# Patient Record
Sex: FEMALE | Race: WHITE | NOT HISPANIC OR LATINO
[De-identification: names, ages, dates, MRNs, and addresses within clinical notes are randomized per-mention and may not be internally consistent; named-entity substitution may affect disease eponyms.]

---

## 2017-02-17 ENCOUNTER — APPOINTMENT (OUTPATIENT)
Dept: CARDIOLOGY | Facility: CLINIC | Age: 72
End: 2017-02-17

## 2017-02-24 ENCOUNTER — APPOINTMENT (OUTPATIENT)
Dept: CARDIOLOGY | Facility: CLINIC | Age: 72
End: 2017-02-24

## 2017-02-28 ENCOUNTER — APPOINTMENT (OUTPATIENT)
Dept: CARDIOLOGY | Facility: CLINIC | Age: 72
End: 2017-02-28

## 2017-09-07 ENCOUNTER — APPOINTMENT (OUTPATIENT)
Dept: CARDIOLOGY | Facility: CLINIC | Age: 72
End: 2017-09-07
Payer: MEDICARE

## 2017-09-07 PROCEDURE — 99214 OFFICE O/P EST MOD 30 MIN: CPT

## 2017-09-28 ENCOUNTER — APPOINTMENT (OUTPATIENT)
Dept: CARDIOLOGY | Facility: CLINIC | Age: 72
End: 2017-09-28
Payer: MEDICARE

## 2017-09-28 PROCEDURE — 78452 HT MUSCLE IMAGE SPECT MULT: CPT

## 2017-09-28 PROCEDURE — A9502: CPT

## 2017-09-28 PROCEDURE — 93015 CV STRESS TEST SUPVJ I&R: CPT

## 2017-10-20 ENCOUNTER — APPOINTMENT (OUTPATIENT)
Dept: CARDIOLOGY | Facility: CLINIC | Age: 72
End: 2017-10-20
Payer: MEDICARE

## 2017-10-20 PROCEDURE — 99214 OFFICE O/P EST MOD 30 MIN: CPT

## 2017-11-15 ENCOUNTER — RX RENEWAL (OUTPATIENT)
Age: 72
End: 2017-11-15

## 2017-12-15 ENCOUNTER — OUTPATIENT (OUTPATIENT)
Dept: OUTPATIENT SERVICES | Facility: HOSPITAL | Age: 72
LOS: 1 days | End: 2017-12-15

## 2017-12-19 DIAGNOSIS — Z87.19 PERSONAL HISTORY OF OTHER DISEASES OF THE DIGESTIVE SYSTEM: ICD-10-CM

## 2017-12-19 DIAGNOSIS — Z86.39 PERSONAL HISTORY OF OTHER ENDOCRINE, NUTRITIONAL AND METABOLIC DISEASE: ICD-10-CM

## 2017-12-19 DIAGNOSIS — Z78.9 OTHER SPECIFIED HEALTH STATUS: ICD-10-CM

## 2017-12-19 DIAGNOSIS — Z86.73 PERSONAL HISTORY OF TRANSIENT ISCHEMIC ATTACK (TIA), AND CEREBRAL INFARCTION W/OUT RESIDUAL DEFICITS: ICD-10-CM

## 2017-12-19 DIAGNOSIS — Z86.69 PERSONAL HISTORY OF OTHER DISEASES OF THE NERVOUS SYSTEM AND SENSE ORGANS: ICD-10-CM

## 2017-12-19 DIAGNOSIS — Z86.79 PERSONAL HISTORY OF OTHER DISEASES OF THE CIRCULATORY SYSTEM: ICD-10-CM

## 2017-12-19 DIAGNOSIS — Z87.11 PERSONAL HISTORY OF PEPTIC ULCER DISEASE: ICD-10-CM

## 2017-12-19 DIAGNOSIS — Z82.3 FAMILY HISTORY OF STROKE: ICD-10-CM

## 2017-12-19 DIAGNOSIS — Z80.6 FAMILY HISTORY OF LEUKEMIA: ICD-10-CM

## 2017-12-19 DIAGNOSIS — Z87.39 PERSONAL HISTORY OF OTHER DISEASES OF THE MUSCULOSKELETAL SYSTEM AND CONNECTIVE TISSUE: ICD-10-CM

## 2017-12-19 RX ORDER — ASPIRIN ENTERIC COATED TABLETS 81 MG 81 MG/1
81 TABLET, DELAYED RELEASE ORAL DAILY
Qty: 30 | Refills: 0 | Status: ACTIVE | COMMUNITY

## 2017-12-21 ENCOUNTER — APPOINTMENT (OUTPATIENT)
Dept: CARDIOLOGY | Facility: CLINIC | Age: 72
End: 2017-12-21
Payer: MEDICARE

## 2017-12-21 ENCOUNTER — NON-APPOINTMENT (OUTPATIENT)
Age: 72
End: 2017-12-21

## 2017-12-21 VITALS
BODY MASS INDEX: 44.56 KG/M2 | DIASTOLIC BLOOD PRESSURE: 70 MMHG | HEIGHT: 61 IN | HEART RATE: 62 BPM | WEIGHT: 236 LBS | SYSTOLIC BLOOD PRESSURE: 116 MMHG

## 2017-12-21 PROCEDURE — 93000 ELECTROCARDIOGRAM COMPLETE: CPT

## 2017-12-21 PROCEDURE — 99214 OFFICE O/P EST MOD 30 MIN: CPT

## 2017-12-21 RX ORDER — MV-MIN/FOLIC/VIT K/LYCOP/COQ10 200-100MCG
CAPSULE ORAL
Refills: 0 | Status: ACTIVE | COMMUNITY

## 2017-12-21 RX ORDER — ATORVASTATIN CALCIUM 40 MG/1
40 TABLET, FILM COATED ORAL DAILY
Refills: 0 | Status: DISCONTINUED | COMMUNITY
End: 2017-12-21

## 2017-12-21 RX ORDER — TRIAMCINOLONE ACETONIDE 55 UL/1
55 SPRAY, METERED NASAL DAILY
Refills: 0 | Status: DISCONTINUED | COMMUNITY
End: 2017-12-21

## 2017-12-21 RX ORDER — FLUTICASONE PROPIONATE 50 MCG
50 SPRAY, SUSPENSION NASAL
Refills: 0 | Status: ACTIVE | COMMUNITY

## 2017-12-29 ENCOUNTER — OUTPATIENT (OUTPATIENT)
Dept: OUTPATIENT SERVICES | Facility: HOSPITAL | Age: 72
LOS: 1 days | End: 2017-12-29

## 2018-01-09 ENCOUNTER — OUTPATIENT (OUTPATIENT)
Dept: OUTPATIENT SERVICES | Facility: HOSPITAL | Age: 73
LOS: 1 days | End: 2018-01-09

## 2018-01-09 ENCOUNTER — INPATIENT (INPATIENT)
Facility: HOSPITAL | Age: 73
LOS: 1 days | Discharge: NURSING, REHAB OR LONG-TERM CARE | End: 2018-01-11
Payer: MEDICARE

## 2018-01-09 PROCEDURE — 73560 X-RAY EXAM OF KNEE 1 OR 2: CPT | Mod: 26,RT

## 2018-01-10 ENCOUNTER — OUTPATIENT (OUTPATIENT)
Dept: OUTPATIENT SERVICES | Facility: HOSPITAL | Age: 73
LOS: 1 days | End: 2018-01-10

## 2018-01-11 ENCOUNTER — INPATIENT (INPATIENT)
Facility: HOSPITAL | Age: 73
LOS: 7 days | Discharge: HOME | End: 2018-01-19

## 2018-02-14 ENCOUNTER — OUTPATIENT (OUTPATIENT)
Dept: OUTPATIENT SERVICES | Facility: HOSPITAL | Age: 73
LOS: 1 days | Discharge: HOME | End: 2018-02-14

## 2018-06-05 ENCOUNTER — RX RENEWAL (OUTPATIENT)
Age: 73
End: 2018-06-05

## 2018-07-06 ENCOUNTER — APPOINTMENT (OUTPATIENT)
Dept: CARDIOLOGY | Facility: CLINIC | Age: 73
End: 2018-07-06
Payer: MEDICARE

## 2018-07-06 ENCOUNTER — NON-APPOINTMENT (OUTPATIENT)
Age: 73
End: 2018-07-06

## 2018-07-06 VITALS
WEIGHT: 216 LBS | OXYGEN SATURATION: 97 % | HEART RATE: 56 BPM | DIASTOLIC BLOOD PRESSURE: 60 MMHG | HEIGHT: 62 IN | SYSTOLIC BLOOD PRESSURE: 106 MMHG | BODY MASS INDEX: 39.75 KG/M2

## 2018-07-06 DIAGNOSIS — Z86.79 PERSONAL HISTORY OF OTHER DISEASES OF THE CIRCULATORY SYSTEM: ICD-10-CM

## 2018-07-06 DIAGNOSIS — Z01.810 ENCOUNTER FOR PREPROCEDURAL CARDIOVASCULAR EXAMINATION: ICD-10-CM

## 2018-07-06 PROCEDURE — 99214 OFFICE O/P EST MOD 30 MIN: CPT

## 2018-07-06 PROCEDURE — 93000 ELECTROCARDIOGRAM COMPLETE: CPT

## 2018-07-06 RX ORDER — METOPROLOL SUCCINATE 50 MG/1
50 TABLET, EXTENDED RELEASE ORAL DAILY
Qty: 30 | Refills: 3 | Status: DISCONTINUED | COMMUNITY
Start: 1900-01-01 | End: 2018-07-06

## 2018-07-06 RX ORDER — HYDROCHLOROTHIAZIDE 12.5 MG/1
12.5 TABLET ORAL DAILY
Refills: 0 | Status: DISCONTINUED | COMMUNITY
End: 2018-07-06

## 2018-09-04 ENCOUNTER — RX RENEWAL (OUTPATIENT)
Age: 73
End: 2018-09-04

## 2018-10-18 ENCOUNTER — APPOINTMENT (OUTPATIENT)
Dept: CARDIOLOGY | Facility: CLINIC | Age: 73
End: 2018-10-18
Payer: MEDICARE

## 2018-10-18 PROCEDURE — 93306 TTE W/DOPPLER COMPLETE: CPT

## 2018-11-08 ENCOUNTER — APPOINTMENT (OUTPATIENT)
Dept: CARDIOLOGY | Facility: CLINIC | Age: 73
End: 2018-11-08
Payer: MEDICARE

## 2018-11-08 ENCOUNTER — NON-APPOINTMENT (OUTPATIENT)
Age: 73
End: 2018-11-08

## 2018-11-08 VITALS
BODY MASS INDEX: 39.01 KG/M2 | WEIGHT: 212 LBS | HEIGHT: 62 IN | SYSTOLIC BLOOD PRESSURE: 116 MMHG | OXYGEN SATURATION: 98 % | HEART RATE: 65 BPM | DIASTOLIC BLOOD PRESSURE: 72 MMHG

## 2018-11-08 PROCEDURE — 99214 OFFICE O/P EST MOD 30 MIN: CPT

## 2018-11-08 PROCEDURE — 93000 ELECTROCARDIOGRAM COMPLETE: CPT

## 2018-11-08 NOTE — REVIEW OF SYSTEMS
[Feeling Fatigued] : feeling fatigued [see HPI] : see HPI [Dyspnea on exertion] : dyspnea during exertion [Joint Pain] : joint pain [Joint Swelling] : joint swelling [Joint Stiffness] : joint stiffness [Negative] : Endocrine

## 2018-11-08 NOTE — PHYSICAL EXAM
[General Appearance - Well Developed] : well developed [Normal Appearance] : normal appearance [Well Groomed] : well groomed [General Appearance - Well Nourished] : well nourished [No Deformities] : no deformities [General Appearance - In No Acute Distress] : no acute distress [Normal Conjunctiva] : the conjunctiva exhibited no abnormalities [Eyelids - No Xanthelasma] : the eyelids demonstrated no xanthelasmas [Normal Oral Mucosa] : normal oral mucosa [No Oral Pallor] : no oral pallor [No Oral Cyanosis] : no oral cyanosis [Normal Jugular Venous A Waves Present] : normal jugular venous A waves present [Normal Jugular Venous V Waves Present] : normal jugular venous V waves present [No Jugular Venous Barriga A Waves] : no jugular venous barriga A waves [Respiration, Rhythm And Depth] : normal respiratory rhythm and effort [Exaggerated Use Of Accessory Muscles For Inspiration] : no accessory muscle use [Auscultation Breath Sounds / Voice Sounds] : lungs were clear to auscultation bilaterally [Heart Rate And Rhythm] : heart rate and rhythm were normal [Heart Sounds] : normal S1 and S2 [Murmurs] : no murmurs present [Nail Clubbing] : no clubbing of the fingernails [Cyanosis, Localized] : no localized cyanosis [Petechial Hemorrhages (___cm)] : no petechial hemorrhages [Skin Color & Pigmentation] : normal skin color and pigmentation [] : no rash [No Venous Stasis] : no venous stasis [Skin Lesions] : no skin lesions [No Skin Ulcers] : no skin ulcer [No Xanthoma] : no  xanthoma was observed [Oriented To Time, Place, And Person] : oriented to person, place, and time [Affect] : the affect was normal [Mood] : the mood was normal [No Anxiety] : not feeling anxious [FreeTextEntry1] : Walks with a limp.

## 2018-11-08 NOTE — REASON FOR VISIT
[Follow-Up - Clinic] : a clinic follow-up of [Abnormal ECG] : an abnormal ECG [Carotid Artery Stenosis] : carotid stenosis [Hyperlipidemia] : hyperlipidemia [Hypertension] : hypertension [Medication Management] : Medication management [Peripheral Vascular Disease] : peripheral vascular disease

## 2018-11-08 NOTE — ASSESSMENT
[FreeTextEntry1] : Above EKG and echocardiogram have been reviewed.\par \par Preoperative status [knee] \par 11/08/2018 \par At present, there are no active cardiac conditions. \par No recent unstable coronary syndromes, decompensated heart failure, severe valvular heart disease or significant dysrhythmias.  \par Baseline functional status is limited.    \par The clinical benefit of the proposed procedure outweighs the associated cardiovascular risk.  \par Risk not attenuated with further CV testing.  \par Prior testing as outlined above.\par Optimized from a cardiovascular perspective.\par Minimize time off antiplatelet therapy. \par Continue beta blocker in the perioperative period. \par \par Peripheral arterial disease. \par Chronic cyspnea. \par Abnormal EKG\par Multiple CRF\par \par Continue aspirin and high intensity statin therapy. \par Blood pressure and heart rate well-controlled on present combination therapy.  Antihypertensives have been refilled.\par Adjunctive heart healthy lifestyle measures. Regular aerobic exercise, low-sodium heart healthy diet.\par Followup with vascular as scheduled\par Reviewed red flag symptoms which would warrant further evaluation.

## 2018-11-12 ENCOUNTER — OUTPATIENT (OUTPATIENT)
Dept: OUTPATIENT SERVICES | Facility: HOSPITAL | Age: 73
LOS: 1 days | End: 2018-11-12

## 2018-11-20 ENCOUNTER — OUTPATIENT (OUTPATIENT)
Dept: OUTPATIENT SERVICES | Facility: HOSPITAL | Age: 73
LOS: 1 days | End: 2018-11-20

## 2018-11-20 ENCOUNTER — INPATIENT (INPATIENT)
Facility: HOSPITAL | Age: 73
LOS: 1 days | Discharge: NURSING, REHAB OR LONG-TERM CARE | End: 2018-11-22
Payer: MEDICARE

## 2018-11-20 PROCEDURE — 73560 X-RAY EXAM OF KNEE 1 OR 2: CPT | Mod: 26,LT

## 2018-11-21 ENCOUNTER — OUTPATIENT (OUTPATIENT)
Dept: OUTPATIENT SERVICES | Facility: HOSPITAL | Age: 73
LOS: 1 days | End: 2018-11-21

## 2018-11-22 ENCOUNTER — OUTPATIENT (OUTPATIENT)
Dept: OUTPATIENT SERVICES | Facility: HOSPITAL | Age: 73
LOS: 1 days | End: 2018-11-22

## 2018-11-22 ENCOUNTER — INPATIENT (INPATIENT)
Facility: HOSPITAL | Age: 73
LOS: 8 days | Discharge: HOME | End: 2018-12-01
Attending: INTERNAL MEDICINE | Admitting: INTERNAL MEDICINE
Payer: MEDICARE

## 2018-11-22 PROCEDURE — 73560 X-RAY EXAM OF KNEE 1 OR 2: CPT | Mod: 26,LT

## 2018-11-23 ENCOUNTER — OUTPATIENT (OUTPATIENT)
Dept: OUTPATIENT SERVICES | Facility: HOSPITAL | Age: 73
LOS: 1 days | End: 2018-11-23

## 2018-11-26 ENCOUNTER — OUTPATIENT (OUTPATIENT)
Dept: OUTPATIENT SERVICES | Facility: HOSPITAL | Age: 73
LOS: 1 days | End: 2018-11-26

## 2018-11-29 ENCOUNTER — OUTPATIENT (OUTPATIENT)
Dept: OUTPATIENT SERVICES | Facility: HOSPITAL | Age: 73
LOS: 1 days | End: 2018-11-29

## 2018-11-29 PROCEDURE — 99306 1ST NF CARE HIGH MDM 50: CPT

## 2018-11-30 PROCEDURE — 99308 SBSQ NF CARE LOW MDM 20: CPT

## 2018-12-27 ENCOUNTER — OUTPATIENT (OUTPATIENT)
Dept: OUTPATIENT SERVICES | Facility: HOSPITAL | Age: 73
LOS: 1 days | Discharge: HOME | End: 2018-12-27

## 2019-01-07 ENCOUNTER — APPOINTMENT (OUTPATIENT)
Dept: CARDIOLOGY | Facility: CLINIC | Age: 74
End: 2019-01-07
Payer: MEDICARE

## 2019-01-07 VITALS
BODY MASS INDEX: 39.75 KG/M2 | HEART RATE: 65 BPM | OXYGEN SATURATION: 95 % | SYSTOLIC BLOOD PRESSURE: 120 MMHG | HEIGHT: 62 IN | WEIGHT: 216 LBS | DIASTOLIC BLOOD PRESSURE: 60 MMHG

## 2019-01-07 DIAGNOSIS — R26.89 OTHER ABNORMALITIES OF GAIT AND MOBILITY: ICD-10-CM

## 2019-01-07 PROCEDURE — 99214 OFFICE O/P EST MOD 30 MIN: CPT

## 2019-01-07 NOTE — ASSESSMENT
[FreeTextEntry1] : \par \par  \par No recent unstable coronary syndromes, decompensated heart failure, severe valvular heart disease or significant dysrhythmias.  \par Baseline functional status is limited.    \par \par Peripheral arterial disease, history of carotid endarectomy. Follow with vascular.\par Chronic Dyspnea, improved. Normal LV function, last nuclear stress test one year ago, negative. \par Abnormal EKG\par Multiple CRF\par \par Continue aspirin and high intensity statin therapy. \par Blood pressure and heart rate well-controlled on present combination therapy. \par Recent dizziness, likely secondary to gabapentin/oxycodone. \par Offered repeat carotid doppler, she would like to pursue this with vascular, and HM she declines.\par Rx for repeat labs provided to check CBC and CMP. \par \par She elects to return in one month. \par Adjunctive heart healthy lifestyle measures. Regular aerobic exercise, low-sodium heart healthy diet.\par \par Reviewed red flag symptoms which would warrant further evaluation.

## 2019-01-07 NOTE — REVIEW OF SYSTEMS
[see HPI] : see HPI [Joint Pain] : joint pain [Joint Swelling] : joint swelling [Joint Stiffness] : joint stiffness [Negative] : Endocrine [Dizziness] : dizziness [Feeling Fatigued] : not feeling fatigued [Dyspnea on exertion] : not dyspnea during exertion

## 2019-01-07 NOTE — HISTORY OF PRESENT ILLNESS
[FreeTextEntry1] : TITA REYNOLDS  is a 73 year old  F\par with a history of peripheral arterial disease status post carotid endarterectomy and multiple atherosclerotic risk factors. \par Not smoking any longer. \par Diabetes mellitus, prior tobacco use, HTN, hyperlipidemia.\par Abnormal EKG\par \par There is no prior history of myocardial infarction, percutaneous or surgical revascularization. \par There is no history of symptomatic congestive heart failure, rheumatic heart disease or valvular heart disease. \par There is no history of clinical dysrhythmia, atrial fibrillation or cerebrovascular disease.\par \par Chronic dyspnea on exertion. Uses inhaler. This is improved after recent increased exercise.\par There is no exertional chest pain, pressure or discomfort. \par There is no orthopnea. \par There are no symptomatic palpitations, or syncope. \par \par Recently, she had TKR (2nd procedure) and was hypertensive, she had change in medical regimen, but is now back to her original anti-hypertensives. She was started on gabapentin and oxycodone and has had dizziness with head motion. \par She is compliant with her medical regimen. \par  \par She has been seen by gastroenterology. "Cleared" of hepatitis and endoscopy was unremarkable. Now back on aspirin therapy. \par \par \par Hyperlipidemia, tolerating statins\par Weight has been steady. No longer following up with bariatric. \par \par \par She is also told of LE peripheral vascular disease. \par She has planned follow up with Dr. Hawkins, and will have carotid US with his office as well. \par There is concomitant neuropathy. \par \par \par \par EKG demonstrates normal sinus rhythm with possible anteroseptal MI of indeterminate age\par \par Pharmacologic vasodilator study 9.17 with normal myocardial perfusion and no evidence of infarction or ischemia and preserved left ventricular function. \par \par Carotid ultrasound was performed on February, 24 2017 which revealed KIMI with less than 50% stenosis and an LICA without significant plaque.\par \par November 2018, Na 136, K+ 4.5, Cr 0.8, HbA1c 6.5%, normal CBC.\par October 2018. Potassium 4.3, creatinine 0.8, hemoglobin 14.0.\par July 2018. Total cholesterol 166, LDL 70, TSH 2.7, hemoglobin 13.0, hemoglobin A1c 6.5, potassium 4.2, creatinine 0.9,  \par \par Echocardiogram October 2018. Ejection fraction of 60%. Left atrial enlargement. No significant valvular heart disease.\par

## 2019-02-07 ENCOUNTER — APPOINTMENT (OUTPATIENT)
Dept: CARDIOLOGY | Facility: CLINIC | Age: 74
End: 2019-02-07
Payer: MEDICARE

## 2019-02-07 VITALS
HEART RATE: 68 BPM | HEIGHT: 62 IN | SYSTOLIC BLOOD PRESSURE: 126 MMHG | OXYGEN SATURATION: 96 % | WEIGHT: 210 LBS | DIASTOLIC BLOOD PRESSURE: 60 MMHG | RESPIRATION RATE: 12 BRPM | BODY MASS INDEX: 38.64 KG/M2

## 2019-02-07 PROCEDURE — 99214 OFFICE O/P EST MOD 30 MIN: CPT

## 2019-02-07 NOTE — PHYSICAL EXAM
[General Appearance - Well Developed] : well developed [Normal Appearance] : normal appearance [Well Groomed] : well groomed [General Appearance - Well Nourished] : well nourished [No Deformities] : no deformities [General Appearance - In No Acute Distress] : no acute distress [Normal Conjunctiva] : the conjunctiva exhibited no abnormalities [Eyelids - No Xanthelasma] : the eyelids demonstrated no xanthelasmas [Normal Oral Mucosa] : normal oral mucosa [No Oral Pallor] : no oral pallor [No Oral Cyanosis] : no oral cyanosis [Normal Jugular Venous A Waves Present] : normal jugular venous A waves present [Normal Jugular Venous V Waves Present] : normal jugular venous V waves present [No Jugular Venous Barriga A Waves] : no jugular venous barriga A waves [] : no respiratory distress [Respiration, Rhythm And Depth] : normal respiratory rhythm and effort [Exaggerated Use Of Accessory Muscles For Inspiration] : no accessory muscle use [Auscultation Breath Sounds / Voice Sounds] : lungs were clear to auscultation bilaterally [Heart Rate And Rhythm] : heart rate and rhythm were normal [Heart Sounds] : normal S1 and S2 [Murmurs] : no murmurs present

## 2019-02-07 NOTE — HISTORY OF PRESENT ILLNESS
[FreeTextEntry1] : Shortness of breath: Subjectively about the same. The patient declines any further investigation. There is no chest discomfort. The patient has had a long history of exertional dyspnea after moderate exertion.\par \par Hypertension: Well controlled.\par \par Hyperlipidemia: The patient is tolerating statin therapy.

## 2019-02-25 ENCOUNTER — OUTPATIENT (OUTPATIENT)
Dept: OUTPATIENT SERVICES | Facility: HOSPITAL | Age: 74
LOS: 1 days | End: 2019-02-25

## 2019-02-25 ENCOUNTER — RX RENEWAL (OUTPATIENT)
Age: 74
End: 2019-02-25

## 2019-05-17 ENCOUNTER — APPOINTMENT (OUTPATIENT)
Dept: CARDIOLOGY | Facility: CLINIC | Age: 74
End: 2019-05-17
Payer: MEDICARE

## 2019-05-17 VITALS
SYSTOLIC BLOOD PRESSURE: 140 MMHG | OXYGEN SATURATION: 96 % | HEART RATE: 75 BPM | BODY MASS INDEX: 38.41 KG/M2 | WEIGHT: 210 LBS | DIASTOLIC BLOOD PRESSURE: 74 MMHG

## 2019-05-17 PROCEDURE — 99214 OFFICE O/P EST MOD 30 MIN: CPT

## 2019-05-19 NOTE — REVIEW OF SYSTEMS
[Feeling Fatigued] : feeling fatigued [Dyspnea on exertion] : dyspnea during exertion [Joint Pain] : joint pain [Joint Swelling] : joint swelling [Joint Stiffness] : joint stiffness [Negative] : Endocrine [Shortness Of Breath] : shortness of breath [see HPI] : see HPI [Anxiety] : anxiety [Under Stress] : under stress

## 2019-05-19 NOTE — ASSESSMENT
[FreeTextEntry1] : Peripheral arterial disease. \par Chronic dyspnea. \par Abnormal EKG\par Multiple CRF\par \par Continue aspirin and high intensity statin therapy. \par Blood pressure and heart rate well-controlled on present combination therapy. \par Adjunctive lifestyle measures. Regular aerobic exercise, low-sodium heart healthy diet.\par Followup with vascular as scheduled,  Outside vascular testing has been requested from [facility] \par \par Reviewed red flag symptoms which would warrant further evaluation. \par

## 2019-05-19 NOTE — HISTORY OF PRESENT ILLNESS
[FreeTextEntry1] : TITA REYNOLDS  is a 73 year old  F\par with a history of peripheral arterial disease status post carotid endarterectomy and multiple atherosclerotic risk factors. \par Not smoking any longer. \par Diabetes mellitus, prior tobacco use, HTN, hyperlipidemia.\par Abnormal EKG\par \par There is no prior history of myocardial infarction, percutaneous or surgical revascularization. \par There is no history of symptomatic congestive heart failure, rheumatic heart disease or valvular heart disease. \par There is no history of clinical dysrhythmia, atrial fibrillation or cerebrovascular disease.\par \par Chronic dyspnea on exertion. Uses inhaler. \par There is no exertional chest pain, pressure or discomfort. \par There is no orthopnea. \par There are no symptomatic palpitations, lightheadedness, dizziness or syncope. \par Weight has been steady. No longer following up with bariatric. \par \par Underwent uncomplicated knee replacement. \par She completed a course of physical therapy. \par She is also told of LE peripheral vascular disease. \par There is concomitant neuropathy. \par After knee procedure. Her medications were adjusted for hypertension and symptoms of dizziness\par On my examination, her blood pressure is 122/72. \par \par Recent fall and has neck pain. She declines Emergency room evaluation.\par \par Echocardiogram October 2018. Ejection fraction of 60%. Left atrial enlargement. No significant valvular heart disease.\par \par EKG demonstrates normal sinus rhythm with possible anteroseptal MI of indeterminate age\par \par Pharmacologic vasodilator study 9.17 with normal myocardial perfusion and no evidence of infarction or ischemia and preserved left ventricular function. \par \par Carotid ultrasound was performed on February, 24 2017 which revealed KIMI with less than 50% stenosis and an LICA without significant plaque.\par \par October 2018. Potassium 4.3, creatinine 0.8, hemoglobin 14.0.\par July 2018. Total cholesterol 166, LDL 70, TSH 2.7, hemoglobin 13.0, hemoglobin A1c 6.5, potassium 4.2, creatinine 0.9, \par November 2018, potassium 4.5, creatinine 0.8, A1c 6.5, hemoglobin 14.0 \par January 2019, potassium 4.2, creatinine 0.8, hemoglobin 13.1\par

## 2019-05-21 ENCOUNTER — RX RENEWAL (OUTPATIENT)
Age: 74
End: 2019-05-21

## 2019-07-01 ENCOUNTER — APPOINTMENT (OUTPATIENT)
Dept: ULTRASOUND IMAGING | Facility: CLINIC | Age: 74
End: 2019-07-01

## 2019-10-03 ENCOUNTER — APPOINTMENT (OUTPATIENT)
Dept: ULTRASOUND IMAGING | Facility: CLINIC | Age: 74
End: 2019-10-03
Payer: MEDICARE

## 2019-10-03 PROCEDURE — 76700 US EXAM ABDOM COMPLETE: CPT

## 2019-10-18 ENCOUNTER — MEDICATION RENEWAL (OUTPATIENT)
Age: 74
End: 2019-10-18

## 2019-12-12 ENCOUNTER — OUTPATIENT (OUTPATIENT)
Dept: OUTPATIENT SERVICES | Facility: HOSPITAL | Age: 74
LOS: 1 days | End: 2019-12-12

## 2020-03-10 ENCOUNTER — EMERGENCY (EMERGENCY)
Facility: HOSPITAL | Age: 75
LOS: 1 days | End: 2020-03-10
Admitting: EMERGENCY MEDICINE
Payer: MEDICARE

## 2020-03-10 PROCEDURE — 71046 X-RAY EXAM CHEST 2 VIEWS: CPT | Mod: 26

## 2020-03-10 PROCEDURE — 99284 EMERGENCY DEPT VISIT MOD MDM: CPT

## 2020-06-04 ENCOUNTER — APPOINTMENT (OUTPATIENT)
Dept: CARDIOLOGY | Facility: CLINIC | Age: 75
End: 2020-06-04
Payer: MEDICARE

## 2020-06-04 ENCOUNTER — NON-APPOINTMENT (OUTPATIENT)
Age: 75
End: 2020-06-04

## 2020-06-04 VITALS
DIASTOLIC BLOOD PRESSURE: 70 MMHG | OXYGEN SATURATION: 96 % | WEIGHT: 210 LBS | BODY MASS INDEX: 38.64 KG/M2 | HEART RATE: 67 BPM | SYSTOLIC BLOOD PRESSURE: 138 MMHG | HEIGHT: 62 IN

## 2020-06-04 DIAGNOSIS — Z01.810 ENCOUNTER FOR PREPROCEDURAL CARDIOVASCULAR EXAMINATION: ICD-10-CM

## 2020-06-04 PROCEDURE — 93000 ELECTROCARDIOGRAM COMPLETE: CPT

## 2020-06-04 PROCEDURE — 99214 OFFICE O/P EST MOD 30 MIN: CPT

## 2020-06-04 NOTE — PHYSICAL EXAM
[General Appearance - Well Developed] : well developed [Normal Appearance] : normal appearance [Well Groomed] : well groomed [General Appearance - Well Nourished] : well nourished [No Deformities] : no deformities [General Appearance - In No Acute Distress] : no acute distress [Normal Conjunctiva] : the conjunctiva exhibited no abnormalities [Eyelids - No Xanthelasma] : the eyelids demonstrated no xanthelasmas [Normal Oral Mucosa] : normal oral mucosa [No Oral Pallor] : no oral pallor [No Oral Cyanosis] : no oral cyanosis [Normal Jugular Venous A Waves Present] : normal jugular venous A waves present [Normal Jugular Venous V Waves Present] : normal jugular venous V waves present [No Jugular Venous Barriga A Waves] : no jugular venous barriga A waves [Respiration, Rhythm And Depth] : normal respiratory rhythm and effort [Exaggerated Use Of Accessory Muscles For Inspiration] : no accessory muscle use [Auscultation Breath Sounds / Voice Sounds] : lungs were clear to auscultation bilaterally [Heart Rate And Rhythm] : heart rate and rhythm were normal [Heart Sounds] : normal S1 and S2 [Murmurs] : no murmurs present [Nail Clubbing] : no clubbing of the fingernails [Cyanosis, Localized] : no localized cyanosis [Petechial Hemorrhages (___cm)] : no petechial hemorrhages [Skin Color & Pigmentation] : normal skin color and pigmentation [] : no rash [No Venous Stasis] : no venous stasis [Skin Lesions] : no skin lesions [No Skin Ulcers] : no skin ulcer [No Xanthoma] : no  xanthoma was observed [Oriented To Time, Place, And Person] : oriented to person, place, and time [Affect] : the affect was normal [Mood] : the mood was normal [No Anxiety] : not feeling anxious [FreeTextEntry1] : Obese

## 2020-06-04 NOTE — HISTORY OF PRESENT ILLNESS
[FreeTextEntry1] : TITA REYNOLDS  is a 74 year old  F\par with a history of peripheral arterial disease status post carotid endarterectomy and multiple atherosclerotic risk factors. \par Not smoking any longer. \par Diabetes mellitus, HTN, hyperlipidemia.\par Also told of LE peripheral vascular disease, neuropathy.\par \par There is no prior history of myocardial infarction, percutaneous or surgical revascularization. \par There is no history of symptomatic congestive heart failure, rheumatic heart disease or valvular heart disease. \par There is no history of clinical dysrhythmia, atrial fibrillation.\par \par In the interim she reports presenting to the walk-in clinic with dyspnea.  There was reported low oxygen saturation.  She was went to the emergency room and was given oxygen and then discharged.  \par \par She reports that there has been improvement in her sugars.  She has dizziness associated with hypoglycemia.  There has been minor weight loss.  Her vascular appointment was deferred.  \par \par Chronic dyspnea on exertion.  \par There is no exertional chest pain, pressure or discomfort. \par There is no orthopnea. \par There are no symptomatic palpitations or syncope. \par \par EKG today demonstrates normal sinus rhythm. \par \par Echocardiogram October 2018. Ejection fraction of 60%. Left atrial enlargement. No significant valvular heart disease.\par \par Pharmacologic vasodilator study 9.17 with normal myocardial perfusion and no evidence of infarction or ischemia and preserved left ventricular function. \par \par Carotid ultrasound was performed on February, 24 2017 which revealed KIMI with less than 50% stenosis and an LICA without significant plaque.\par \par July 2018. Total cholesterol 166, LDL 70, TSH 2.7, hemoglobin 13.0, hemoglobin A1c 6.5, potassium 4.2, creatinine 0.9, \par November 2018, potassium 4.5, creatinine 0.8, A1c 6.5, hemoglobin 14.0 \par January 2019, potassium 4.2, creatinine 0.8, hemoglobin 13.1\par

## 2020-06-04 NOTE — REVIEW OF SYSTEMS
[Feeling Fatigued] : feeling fatigued [Shortness Of Breath] : shortness of breath [Dyspnea on exertion] : dyspnea during exertion [Joint Pain] : joint pain [Joint Swelling] : joint swelling [Joint Stiffness] : joint stiffness [Anxiety] : anxiety [Under Stress] : under stress [Negative] : Endocrine [see HPI] : see HPI [Dizziness] : dizziness

## 2020-06-04 NOTE — REASON FOR VISIT
[Follow-Up - Clinic] : a clinic follow-up of [Carotid Artery Stenosis] : carotid stenosis [Hyperlipidemia] : hyperlipidemia [Hypertension] : hypertension [Medication Management] : Medication management [Peripheral Vascular Disease] : peripheral vascular disease [Dyspnea] : dyspnea

## 2020-06-04 NOTE — ASSESSMENT
[FreeTextEntry1] : Peripheral arterial disease. \par Multiple CRF DM etc\par Dysnea, recent acute on chronic\par \par Recent emergency room records have been requested.  \par Pulmonary follow-up.  \par Echocardiogram.  \par Requested last blood work her primary physician.\par Requested outside vascular evaluation.  \par \par Continue aspirin and high intensity statin therapy. \par Blood pressure and heart rate well-controlled on combination therapy. \par Adjunctive lifestyle measures. Regular aerobic exercise, low-sodium heart healthy diet.\par \par Reviewed red flag symptoms which would warrant further evaluation. \par \par

## 2020-07-20 ENCOUNTER — APPOINTMENT (OUTPATIENT)
Dept: CARDIOLOGY | Facility: CLINIC | Age: 75
End: 2020-07-20
Payer: MEDICARE

## 2020-07-20 PROCEDURE — 93306 TTE W/DOPPLER COMPLETE: CPT

## 2020-08-03 ENCOUNTER — OUTPATIENT (OUTPATIENT)
Dept: OUTPATIENT SERVICES | Facility: HOSPITAL | Age: 75
LOS: 1 days | End: 2020-08-03

## 2020-08-21 ENCOUNTER — APPOINTMENT (OUTPATIENT)
Dept: CARDIOLOGY | Facility: CLINIC | Age: 75
End: 2020-08-21
Payer: MEDICARE

## 2020-08-21 VITALS
HEART RATE: 73 BPM | HEIGHT: 62 IN | OXYGEN SATURATION: 96 % | BODY MASS INDEX: 39.01 KG/M2 | WEIGHT: 212 LBS | DIASTOLIC BLOOD PRESSURE: 68 MMHG | SYSTOLIC BLOOD PRESSURE: 134 MMHG

## 2020-08-21 PROCEDURE — 99214 OFFICE O/P EST MOD 30 MIN: CPT

## 2020-08-21 NOTE — REVIEW OF SYSTEMS
[Feeling Fatigued] : feeling fatigued [Shortness Of Breath] : shortness of breath [Dyspnea on exertion] : dyspnea during exertion [Joint Pain] : joint pain [Joint Stiffness] : joint stiffness [Joint Swelling] : joint swelling [see HPI] : see HPI [Dizziness] : dizziness [Anxiety] : anxiety [Under Stress] : under stress [Negative] : Respiratory

## 2020-08-21 NOTE — REASON FOR VISIT
[Follow-Up - Clinic] : a clinic follow-up of [Carotid Artery Stenosis] : carotid stenosis [Dyspnea] : dyspnea [Hyperlipidemia] : hyperlipidemia [Hypertension] : hypertension [Medication Management] : Medication management [Peripheral Vascular Disease] : peripheral vascular disease

## 2020-08-24 NOTE — ASSESSMENT
[FreeTextEntry1] : Peripheral arterial disease. \par Multiple CRF DM etc\par Dysnea\par \par Recent echocardiogram and pulmonary consultation have been reviewed.  \par Vascular follow-up as scheduled.\par Continue aspirin and high intensity statin therapy. \par Blood pressure and heart rate well-controlled on combination therapy. \par Adjunctive lifestyle measures. Regular aerobic exercise, low-sodium heart healthy diet.\par \par Reviewed red flag symptoms which would warrant further evaluation. \par

## 2020-08-24 NOTE — PHYSICAL EXAM
[General Appearance - Well Developed] : well developed [Normal Appearance] : normal appearance [Well Groomed] : well groomed [General Appearance - Well Nourished] : well nourished [No Deformities] : no deformities [General Appearance - In No Acute Distress] : no acute distress [Normal Conjunctiva] : the conjunctiva exhibited no abnormalities [Normal Oral Mucosa] : normal oral mucosa [Eyelids - No Xanthelasma] : the eyelids demonstrated no xanthelasmas [No Oral Pallor] : no oral pallor [No Oral Cyanosis] : no oral cyanosis [No Jugular Venous Barriga A Waves] : no jugular venous barriga A waves [Normal Jugular Venous A Waves Present] : normal jugular venous A waves present [Normal Jugular Venous V Waves Present] : normal jugular venous V waves present [Respiration, Rhythm And Depth] : normal respiratory rhythm and effort [Exaggerated Use Of Accessory Muscles For Inspiration] : no accessory muscle use [Auscultation Breath Sounds / Voice Sounds] : lungs were clear to auscultation bilaterally [Heart Rate And Rhythm] : heart rate and rhythm were normal [Heart Sounds] : normal S1 and S2 [Murmurs] : no murmurs present [Cyanosis, Localized] : no localized cyanosis [Nail Clubbing] : no clubbing of the fingernails [Petechial Hemorrhages (___cm)] : no petechial hemorrhages [Skin Color & Pigmentation] : normal skin color and pigmentation [] : no rash [Skin Lesions] : no skin lesions [No Venous Stasis] : no venous stasis [No Skin Ulcers] : no skin ulcer [No Xanthoma] : no  xanthoma was observed [Oriented To Time, Place, And Person] : oriented to person, place, and time [Affect] : the affect was normal [Mood] : the mood was normal [No Anxiety] : not feeling anxious [FreeTextEntry1] : Walks with a limp.

## 2020-08-24 NOTE — HISTORY OF PRESENT ILLNESS
[FreeTextEntry1] : TITA RYENOLDS  is a 75 year old  F\par with a history of peripheral arterial disease status post carotid endarterectomy and multiple atherosclerotic risk factors. \par Not smoking any longer. \par Diabetes mellitus, HTN, hyperlipidemia.\par Also told of LE peripheral vascular disease, neuropathy.\par \par There is no prior history of myocardial infarction, percutaneous or surgical revascularization. \par There is no history of symptomatic congestive heart failure, rheumatic heart disease or valvular heart disease. \par There is no history of clinical dysrhythmia, atrial fibrillation.\par \par Recently presented to walk-in clinic with dyspnea.  There was reported low oxygen saturation.   Went to the emergency room and was given oxygen and then discharged.  \par \par Chronic dyspnea on exertion.  \par There is no exertional chest pain, pressure or discomfort. \par There is no orthopnea. \par There are no symptomatic palpitations or syncope. \par \par Seen by pulmonary physician and bronchodilators were adjusted \par \par Echocardiogram demonstrated normal left ventricular function, mild mitral regurgitation, mild tricuspid regurgitation.  \par \par Blood work August 2020 TSH 2.5, total cholesterol 181, LDL 6 6, potassium 4.8, creatinine 0.8, hemoglobin 13.3.  \par \par EKG demonstrates normal sinus rhythm. \par \par Pharmacologic vasodilator study 9.17 with normal myocardial perfusion and no evidence of infarction or ischemia and preserved left ventricular function. \par \par Carotid ultrasound was performed on February, 24 2017 which revealed KIMI with less than 50% stenosis and an LICA without significant plaque.\par \par July 2018. Total cholesterol 166, LDL 70, TSH 2.7, hemoglobin 13.0, hemoglobin A1c 6.5, potassium 4.2, creatinine 0.9, \par November 2018, potassium 4.5, creatinine 0.8, A1c 6.5, hemoglobin 14.0 \par January 2019, potassium 4.2, creatinine 0.8, hemoglobin 13.1\par \par \par

## 2020-09-26 ENCOUNTER — APPOINTMENT (OUTPATIENT)
Dept: MAMMOGRAPHY | Facility: CLINIC | Age: 75
End: 2020-09-26
Payer: MEDICARE

## 2020-09-26 PROCEDURE — 77063 BREAST TOMOSYNTHESIS BI: CPT

## 2020-09-26 PROCEDURE — 77067 SCR MAMMO BI INCL CAD: CPT

## 2020-10-16 ENCOUNTER — RESULT REVIEW (OUTPATIENT)
Age: 75
End: 2020-10-16

## 2020-10-16 ENCOUNTER — APPOINTMENT (OUTPATIENT)
Dept: ULTRASOUND IMAGING | Facility: CLINIC | Age: 75
End: 2020-10-16
Payer: MEDICARE

## 2020-10-16 PROCEDURE — 76700 US EXAM ABDOM COMPLETE: CPT

## 2020-11-12 ENCOUNTER — OUTPATIENT (OUTPATIENT)
Dept: OUTPATIENT SERVICES | Facility: HOSPITAL | Age: 75
LOS: 1 days | End: 2020-11-12

## 2021-01-31 ENCOUNTER — APPOINTMENT (OUTPATIENT)
Dept: DISASTER EMERGENCY | Facility: CLINIC | Age: 76
End: 2021-01-31

## 2021-02-01 LAB — SARS-COV-2 N GENE NPH QL NAA+PROBE: NOT DETECTED

## 2021-02-02 ENCOUNTER — NON-APPOINTMENT (OUTPATIENT)
Age: 76
End: 2021-02-02

## 2021-02-03 ENCOUNTER — OUTPATIENT (OUTPATIENT)
Dept: OUTPATIENT SERVICES | Facility: HOSPITAL | Age: 76
LOS: 1 days | End: 2021-02-03

## 2021-02-04 ENCOUNTER — APPOINTMENT (OUTPATIENT)
Dept: CARDIOLOGY | Facility: CLINIC | Age: 76
End: 2021-02-04

## 2021-02-10 ENCOUNTER — RESULT CHARGE (OUTPATIENT)
Age: 76
End: 2021-02-10

## 2021-02-11 ENCOUNTER — NON-APPOINTMENT (OUTPATIENT)
Age: 76
End: 2021-02-11

## 2021-02-11 ENCOUNTER — APPOINTMENT (OUTPATIENT)
Dept: CARDIOLOGY | Facility: CLINIC | Age: 76
End: 2021-02-11
Payer: MEDICARE

## 2021-02-11 VITALS
WEIGHT: 216 LBS | TEMPERATURE: 97.1 F | HEART RATE: 77 BPM | BODY MASS INDEX: 39.51 KG/M2 | DIASTOLIC BLOOD PRESSURE: 60 MMHG | SYSTOLIC BLOOD PRESSURE: 122 MMHG | OXYGEN SATURATION: 96 %

## 2021-02-11 PROCEDURE — 99215 OFFICE O/P EST HI 40 MIN: CPT

## 2021-02-11 PROCEDURE — 93000 ELECTROCARDIOGRAM COMPLETE: CPT

## 2021-02-11 NOTE — REVIEW OF SYSTEMS
[Feeling Fatigued] : feeling fatigued [Shortness Of Breath] : shortness of breath [Dyspnea on exertion] : dyspnea during exertion [Joint Pain] : joint pain [Joint Swelling] : joint swelling [Joint Stiffness] : joint stiffness [Dizziness] : dizziness [see HPI] : see HPI [Anxiety] : anxiety [Under Stress] : under stress [Negative] : Endocrine [Chest Pain] : chest pain [Lower Ext Edema] : lower extremity edema

## 2021-02-12 ENCOUNTER — APPOINTMENT (OUTPATIENT)
Dept: CARDIOLOGY | Facility: CLINIC | Age: 76
End: 2021-02-12

## 2021-02-12 NOTE — ADDENDUM
[FreeTextEntry1] : Please note the patient was reviewed with the PA.\par I was physically present during the service of the patient\par I was directly involved in the management plan and recommendations of care provided to the patient. \par I personally reviewed the history and physical exam and plan as documented by the PA above.\par \par Bacilio Cohn DO, FACC, RPVI\par Cardiologist\par \par \par

## 2021-02-12 NOTE — HISTORY OF PRESENT ILLNESS
[FreeTextEntry1] : TITA REYNOLDS  is a 75 year old  F\par with a history of peripheral arterial disease status post carotid endarterectomy and multiple atherosclerotic risk factors. \par Not smoking any longer. \par Diabetes mellitus, HTN, hyperlipidemia.\par Also told of LE peripheral vascular disease, neuropathy.\par \par There is no prior history of myocardial infarction, percutaneous or surgical revascularization. \par There is no history of symptomatic congestive heart failure, rheumatic heart disease or valvular heart disease. \par There is no history of clinical dysrhythmia, atrial fibrillation.\par \par Chronic dyspnea on exertion.  \par 2 weeks ago developed left-sided chest discomfort after carrying a 40 pound bag of kwan litter up some stairs.  Patient states it was positional.  Intermittent.  Relieved on its own.  No other associated symptoms.\par Notes significant bilateral edema several days ago after eating [employer] chicken noodle soup.  Resolved on its own.  No edema noted today.\par There is no orthopnea. \par There are no symptomatic palpitations or syncope. \par \par Being followed by PMD for significant diarrhea.  Lab work as well as stool evaluation for O&P pending.\par \par EKG done in the office today reveals NSR with global T wave inversions.  This is new when compared to prior.  \par \par Echocardiogram demonstrated normal left ventricular function, mild mitral regurgitation, mild tricuspid regurgitation.  \par \par Blood work August 2020 TSH 2.5, total cholesterol 181, LDL 6 6, potassium 4.8, creatinine 0.8, hemoglobin 13.3.  \par \par Pharmacologic vasodilator study 9.17 with normal myocardial perfusion and no evidence of infarction or ischemia and preserved left ventricular function. \par \par Carotid ultrasound was performed on February, 24 2017 which revealed KIMI with less than 50% stenosis and an LICA without significant plaque.\par \par July 2018. Total cholesterol 166, LDL 70, TSH 2.7, hemoglobin 13.0, hemoglobin A1c 6.5, potassium 4.2, creatinine 0.9, \par November 2018, potassium 4.5, creatinine 0.8, A1c 6.5, hemoglobin 14.0 \par January 2019, potassium 4.2, creatinine 0.8, hemoglobin 13.1\par \par \par

## 2021-02-12 NOTE — ASSESSMENT
[FreeTextEntry1] : Peripheral arterial disease. \par Multiple CRF DM etc\par Dysnea\par CP\par Abnormal EKG\par \par Recent echocardiogram and pulmonary consultation had been reviewed.  \par Vascular follow-up as scheduled.  Carotid US.  \par Nuclear stress test \par Labs for electrolytes\par Red flag symptoms that would warrant emergent evaluation discussed.  Pt verbalizes understanding. \par Continue aspirin and high intensity statin therapy. \par Blood pressure and heart rate well-controlled on combination therapy. \par Adjunctive lifestyle measures. Regular aerobic exercise, low-sodium heart healthy diet.

## 2021-02-12 NOTE — PHYSICAL EXAM
[General Appearance - Well Developed] : well developed [Normal Appearance] : normal appearance [Well Groomed] : well groomed [General Appearance - Well Nourished] : well nourished [No Deformities] : no deformities [General Appearance - In No Acute Distress] : no acute distress [Normal Conjunctiva] : the conjunctiva exhibited no abnormalities [Eyelids - No Xanthelasma] : the eyelids demonstrated no xanthelasmas [Normal Jugular Venous A Waves Present] : normal jugular venous A waves present [Normal Jugular Venous V Waves Present] : normal jugular venous V waves present [No Jugular Venous Barriga A Waves] : no jugular venous barriga A waves [Respiration, Rhythm And Depth] : normal respiratory rhythm and effort [Exaggerated Use Of Accessory Muscles For Inspiration] : no accessory muscle use [Auscultation Breath Sounds / Voice Sounds] : lungs were clear to auscultation bilaterally [Heart Rate And Rhythm] : heart rate and rhythm were normal [Heart Sounds] : normal S1 and S2 [Murmurs] : no murmurs present [Nail Clubbing] : no clubbing of the fingernails [Cyanosis, Localized] : no localized cyanosis [Petechial Hemorrhages (___cm)] : no petechial hemorrhages [Skin Color & Pigmentation] : normal skin color and pigmentation [] : no rash [No Venous Stasis] : no venous stasis [Skin Lesions] : no skin lesions [No Skin Ulcers] : no skin ulcer [No Xanthoma] : no  xanthoma was observed [Oriented To Time, Place, And Person] : oriented to person, place, and time [Affect] : the affect was normal [Mood] : the mood was normal [No Anxiety] : not feeling anxious [Edema] : no peripheral edema present [Abdomen Soft] : soft [FreeTextEntry1] : Walks with a limp.

## 2021-02-15 ENCOUNTER — APPOINTMENT (OUTPATIENT)
Dept: CARDIOLOGY | Facility: CLINIC | Age: 76
End: 2021-02-15
Payer: MEDICARE

## 2021-02-15 PROCEDURE — 93880 EXTRACRANIAL BILAT STUDY: CPT

## 2021-03-08 ENCOUNTER — APPOINTMENT (OUTPATIENT)
Dept: CARDIOLOGY | Facility: CLINIC | Age: 76
End: 2021-03-08
Payer: MEDICARE

## 2021-03-08 PROCEDURE — A9502: CPT

## 2021-03-08 PROCEDURE — 93015 CV STRESS TEST SUPVJ I&R: CPT

## 2021-03-08 PROCEDURE — 78452 HT MUSCLE IMAGE SPECT MULT: CPT

## 2021-03-19 ENCOUNTER — APPOINTMENT (OUTPATIENT)
Dept: CARDIOLOGY | Facility: CLINIC | Age: 76
End: 2021-03-19

## 2021-03-23 ENCOUNTER — OUTPATIENT (OUTPATIENT)
Dept: OUTPATIENT SERVICES | Facility: HOSPITAL | Age: 76
LOS: 1 days | End: 2021-03-23

## 2021-03-23 ENCOUNTER — APPOINTMENT (OUTPATIENT)
Dept: CARDIOLOGY | Facility: CLINIC | Age: 76
End: 2021-03-23
Payer: MEDICARE

## 2021-03-23 VITALS
HEIGHT: 61.5 IN | TEMPERATURE: 97.1 F | WEIGHT: 220 LBS | HEART RATE: 66 BPM | DIASTOLIC BLOOD PRESSURE: 60 MMHG | BODY MASS INDEX: 41.01 KG/M2 | OXYGEN SATURATION: 95 % | SYSTOLIC BLOOD PRESSURE: 130 MMHG

## 2021-03-23 PROCEDURE — 99215 OFFICE O/P EST HI 40 MIN: CPT

## 2021-03-23 NOTE — ASSESSMENT
[FreeTextEntry1] : Peripheral arterial disease. \par Multiple CRF DM etc\par Dysnea\par CP\par Abnormal EKG\par Anterior ischemia on nuclear stress. \par Discussed cardiac catheterization.  Risk/benefit.  Pt is willing to proceed. \par Red flag symptoms that would warrant emergent evaluation discussed.  Pt verbalizes understanding. \par \par Vascular follow-up as scheduled.  Carotid US.  \par Continue aspirin and high intensity statin therapy. \par Blood pressure and heart rate well-controlled on combination therapy. \par Adjunctive lifestyle measures. Regular aerobic exercise, low-sodium heart healthy diet.

## 2021-03-23 NOTE — REVIEW OF SYSTEMS
[Feeling Fatigued] : feeling fatigued [Shortness Of Breath] : shortness of breath [Dyspnea on exertion] : dyspnea during exertion [Chest Pain] : chest pain [Lower Ext Edema] : lower extremity edema [Joint Pain] : joint pain [Joint Swelling] : joint swelling [Joint Stiffness] : joint stiffness [Dizziness] : dizziness [see HPI] : see HPI [Anxiety] : anxiety [Under Stress] : under stress [Negative] : Endocrine

## 2021-03-23 NOTE — REASON FOR VISIT
[Follow-Up - Clinic] : a clinic follow-up of [Carotid Artery Stenosis] : carotid stenosis [Dyspnea] : dyspnea [Hyperlipidemia] : hyperlipidemia [Hypertension] : hypertension [Medication Management] : Medication management [Peripheral Vascular Disease] : peripheral vascular disease [FreeTextEntry2] : Review nuclear stress test.

## 2021-03-23 NOTE — PHYSICAL EXAM
[General Appearance - Well Developed] : well developed [Normal Appearance] : normal appearance [Well Groomed] : well groomed [General Appearance - Well Nourished] : well nourished [No Deformities] : no deformities [General Appearance - In No Acute Distress] : no acute distress [Normal Conjunctiva] : the conjunctiva exhibited no abnormalities [Eyelids - No Xanthelasma] : the eyelids demonstrated no xanthelasmas [Normal Jugular Venous A Waves Present] : normal jugular venous A waves present [Normal Jugular Venous V Waves Present] : normal jugular venous V waves present [No Jugular Venous Barriga A Waves] : no jugular venous barriga A waves [Respiration, Rhythm And Depth] : normal respiratory rhythm and effort [Exaggerated Use Of Accessory Muscles For Inspiration] : no accessory muscle use [Auscultation Breath Sounds / Voice Sounds] : lungs were clear to auscultation bilaterally [Heart Rate And Rhythm] : heart rate and rhythm were normal [Heart Sounds] : normal S1 and S2 [Murmurs] : no murmurs present [Edema] : no peripheral edema present [Abdomen Soft] : soft [Nail Clubbing] : no clubbing of the fingernails [Cyanosis, Localized] : no localized cyanosis [Petechial Hemorrhages (___cm)] : no petechial hemorrhages [Skin Color & Pigmentation] : normal skin color and pigmentation [] : no rash [No Venous Stasis] : no venous stasis [Skin Lesions] : no skin lesions [No Skin Ulcers] : no skin ulcer [No Xanthoma] : no  xanthoma was observed [Oriented To Time, Place, And Person] : oriented to person, place, and time [Affect] : the affect was normal [Mood] : the mood was normal [No Anxiety] : not feeling anxious [FreeTextEntry1] : Walks with a limp.

## 2021-03-23 NOTE — HISTORY OF PRESENT ILLNESS
[FreeTextEntry1] : TITA REYNOLDS  is a 75 year old  F\par with a history of peripheral arterial disease status post carotid endarterectomy and multiple atherosclerotic risk factors. \par Not smoking any longer. \par Diabetes mellitus, HTN, hyperlipidemia.\par Also told of LE peripheral vascular disease, neuropathy.\par \par There is no prior history of myocardial infarction, percutaneous or surgical revascularization. \par There is no history of symptomatic congestive heart failure, rheumatic heart disease or valvular heart disease. \par There is no history of clinical dysrhythmia, atrial fibrillation.\par \par Chronic dyspnea on exertion.  \par 1 month ago developed left-sided chest discomfort after carrying a 40 pound bag of kwan litter up some stairs.  Patient states it was positional.  Intermittent.  Relieved on its own.  No other associated symptoms.\par Notes significant bilateral edema after eating [employer] chicken noodle soup.  Resolved on its own.  Trace edema noted today.\par There is no orthopnea. \par There are no symptomatic palpitations or syncope. \par \par EKG revealed NSR with global T wave inversions.  This is new when compared to prior.  Subsequent nuclear stress test revealed anterior ischemia.  \par \par Echocardiogram 7/20 demonstrated normal left ventricular function, mild mitral regurgitation, mild tricuspid regurgitation.  \par \par Blood work August 2020 TSH 2.5, total cholesterol 181, LDL 6 6, potassium 4.8, creatinine 0.8, hemoglobin 13.3.  \par \par Carotid ultrasound was performed on February, 24 2017 which revealed KIMI with less than 50% stenosis and an LICA without significant plaque.\par \par July 2018. Total cholesterol 166, LDL 70, TSH 2.7, hemoglobin 13.0, hemoglobin A1c 6.5, potassium 4.2, creatinine 0.9, \par November 2018, potassium 4.5, creatinine 0.8, A1c 6.5, hemoglobin 14.0 \par January 2019, potassium 4.2, creatinine 0.8, hemoglobin 13.1

## 2021-03-26 ENCOUNTER — APPOINTMENT (OUTPATIENT)
Dept: DISASTER EMERGENCY | Facility: CLINIC | Age: 76
End: 2021-03-26

## 2021-03-26 DIAGNOSIS — Z01.818 ENCOUNTER FOR OTHER PREPROCEDURAL EXAMINATION: ICD-10-CM

## 2021-03-27 LAB — SARS-COV-2 N GENE NPH QL NAA+PROBE: NOT DETECTED

## 2021-03-29 ENCOUNTER — OUTPATIENT (OUTPATIENT)
Dept: INPATIENT UNIT | Facility: HOSPITAL | Age: 76
LOS: 1 days | End: 2021-03-29
Payer: MEDICARE

## 2021-03-29 PROCEDURE — 93010 ELECTROCARDIOGRAM REPORT: CPT

## 2021-03-29 PROCEDURE — 93458 L HRT ARTERY/VENTRICLE ANGIO: CPT | Mod: 26

## 2021-03-31 ENCOUNTER — APPOINTMENT (OUTPATIENT)
Dept: CARDIOLOGY | Facility: CLINIC | Age: 76
End: 2021-03-31
Payer: MEDICARE

## 2021-03-31 VITALS
HEART RATE: 72 BPM | TEMPERATURE: 96.6 F | HEIGHT: 61 IN | BODY MASS INDEX: 41.91 KG/M2 | WEIGHT: 222 LBS | SYSTOLIC BLOOD PRESSURE: 118 MMHG | DIASTOLIC BLOOD PRESSURE: 64 MMHG | OXYGEN SATURATION: 96 %

## 2021-03-31 PROCEDURE — 99213 OFFICE O/P EST LOW 20 MIN: CPT

## 2021-03-31 NOTE — ASSESSMENT
[FreeTextEntry1] : Shortness of breath: Patient is following with pulmonary in this regard.  The patient is essentially at her baseline.  Invasive coronary angiography revealed nonobstructive disease.  Transthoracic echocardiography revealed a structurally normal heart.  Continued observation is her best option.\par \par Hypertension: Well-controlled.

## 2021-03-31 NOTE — PHYSICAL EXAM
[FreeTextEntry1] : 118/64, 14, 72 [Heart Rate And Rhythm] : heart rate and rhythm were normal [Murmurs] : no murmurs present [Heart Sounds] : normal S1 and S2

## 2021-06-01 ENCOUNTER — OUTPATIENT (OUTPATIENT)
Dept: OUTPATIENT SERVICES | Facility: HOSPITAL | Age: 76
LOS: 1 days | End: 2021-06-01

## 2021-08-02 ENCOUNTER — APPOINTMENT (OUTPATIENT)
Dept: CT IMAGING | Facility: CLINIC | Age: 76
End: 2021-08-02
Payer: MEDICARE

## 2021-08-02 PROCEDURE — 71250 CT THORAX DX C-: CPT | Mod: MH

## 2021-08-10 ENCOUNTER — OUTPATIENT (OUTPATIENT)
Dept: OUTPATIENT SERVICES | Facility: HOSPITAL | Age: 76
LOS: 1 days | End: 2021-08-10

## 2021-09-17 ENCOUNTER — APPOINTMENT (OUTPATIENT)
Dept: CARDIOLOGY | Facility: CLINIC | Age: 76
End: 2021-09-17
Payer: MEDICARE

## 2021-09-17 VITALS
SYSTOLIC BLOOD PRESSURE: 146 MMHG | WEIGHT: 221 LBS | OXYGEN SATURATION: 96 % | HEIGHT: 61 IN | TEMPERATURE: 96.4 F | DIASTOLIC BLOOD PRESSURE: 60 MMHG | BODY MASS INDEX: 41.72 KG/M2 | HEART RATE: 75 BPM

## 2021-09-17 PROCEDURE — 99214 OFFICE O/P EST MOD 30 MIN: CPT

## 2021-09-17 RX ORDER — ATENOLOL 50 MG/1
50 TABLET ORAL DAILY
Qty: 90 | Refills: 3 | Status: DISCONTINUED | COMMUNITY
Start: 2018-02-08 | End: 2021-09-17

## 2021-09-21 NOTE — HISTORY OF PRESENT ILLNESS
[FreeTextEntry1] : TITA REYNOLDS  is a 76 year old  F\par \par with a history of peripheral arterial disease status post carotid endarterectomy and multiple atherosclerotic risk factors. \par Not smoking any longer. \par Diabetes mellitus, HTN, hyperlipidemia.\par Also told of LE peripheral vascular disease, neuropathy.\par \par There is no prior history of myocardial infarction, percutaneous or surgical revascularization. \par There is no history of symptomatic congestive heart failure, rheumatic heart disease or valvular heart disease. \par There is no history of clinical dysrhythmia, atrial fibrillation.\par \par Chronic dyspnea on exertion.  \par There is no orthopnea. \par There are no symptomatic palpitations or syncope. \par \par She does pulmonary rehab \par major limitation is shortness of breath \par her inhaler was adjusted by her pulmonary physician \par she also has significant musculoskeletal pain \par \par last EKG demonstrates sinus rhythm\par cardiac catheterization normal left ventricular function nonobstructive coronary artery disease\par Echocardiogram 7/20 demonstrated normal left ventricular function, mild mitral regurgitation, mild tricuspid regurgitation.  \par Blood work August 2020 TSH 2.5, total cholesterol 181, LDL 6 6, potassium 4.8, creatinine 0.8, hemoglobin 13.3.  \par Carotid ultrasound was performed on February, 24 2017 which revealed KIMI with less than 50% stenosis and an LICA without significant plaque.\par \par

## 2021-09-21 NOTE — ASSESSMENT
[FreeTextEntry1] : Peripheral arterial disease. \par Multiple CRF DM etc\par Dysnea\par Cath reviewed\par Monitor for cor pulmonale\par discussed changing atenolol to metoprolol \par Red flag symptoms that would warrant emergent evaluation discussed.  \par \par Vascular follow-up as scheduled.  Carotid US.  \par Continue aspirin and high intensity statin therapy. \par Blood pressure and heart rate well-controlled on combination therapy. \par Adjunctive lifestyle measures. Regular aerobic exercise, low-sodium heart healthy diet.

## 2021-09-21 NOTE — REASON FOR VISIT
[Symptom and Test Evaluation] : symptom and test evaluation [Hyperlipidemia] : hyperlipidemia [Hypertension] : hypertension [Carotid, Aortic and Peripheral Vascular Disease] : carotid, aortic and peripheral vascular disease

## 2021-10-13 ENCOUNTER — NON-APPOINTMENT (OUTPATIENT)
Age: 76
End: 2021-10-13

## 2021-11-24 ENCOUNTER — APPOINTMENT (OUTPATIENT)
Dept: ULTRASOUND IMAGING | Facility: CLINIC | Age: 76
End: 2021-11-24

## 2021-11-24 ENCOUNTER — APPOINTMENT (OUTPATIENT)
Dept: MAMMOGRAPHY | Facility: CLINIC | Age: 76
End: 2021-11-24
Payer: MEDICARE

## 2021-11-24 PROCEDURE — 76700 US EXAM ABDOM COMPLETE: CPT

## 2021-11-24 PROCEDURE — 77063 BREAST TOMOSYNTHESIS BI: CPT

## 2021-11-24 PROCEDURE — 77067 SCR MAMMO BI INCL CAD: CPT

## 2021-12-10 ENCOUNTER — OUTPATIENT (OUTPATIENT)
Dept: OUTPATIENT SERVICES | Facility: HOSPITAL | Age: 76
LOS: 1 days | End: 2021-12-10

## 2021-12-10 ENCOUNTER — INPATIENT (INPATIENT)
Facility: HOSPITAL | Age: 76
LOS: 4 days | Discharge: HOME | End: 2021-12-15
Attending: FAMILY MEDICINE
Payer: MEDICARE

## 2021-12-10 PROCEDURE — 99285 EMERGENCY DEPT VISIT HI MDM: CPT

## 2021-12-10 PROCEDURE — 71275 CT ANGIOGRAPHY CHEST: CPT | Mod: 26

## 2021-12-10 PROCEDURE — 99220: CPT

## 2021-12-10 PROCEDURE — 93010 ELECTROCARDIOGRAM REPORT: CPT

## 2021-12-10 PROCEDURE — 71045 X-RAY EXAM CHEST 1 VIEW: CPT | Mod: 26

## 2021-12-11 ENCOUNTER — OUTPATIENT (OUTPATIENT)
Dept: OUTPATIENT SERVICES | Facility: HOSPITAL | Age: 76
LOS: 1 days | End: 2021-12-11

## 2021-12-11 PROCEDURE — 99233 SBSQ HOSP IP/OBS HIGH 50: CPT

## 2021-12-12 ENCOUNTER — OUTPATIENT (OUTPATIENT)
Dept: OUTPATIENT SERVICES | Facility: HOSPITAL | Age: 76
LOS: 1 days | End: 2021-12-12

## 2021-12-12 PROCEDURE — 99233 SBSQ HOSP IP/OBS HIGH 50: CPT

## 2021-12-13 ENCOUNTER — OUTPATIENT (OUTPATIENT)
Dept: OUTPATIENT SERVICES | Facility: HOSPITAL | Age: 76
LOS: 1 days | End: 2021-12-13

## 2021-12-13 PROCEDURE — 93306 TTE W/DOPPLER COMPLETE: CPT | Mod: 26

## 2021-12-13 PROCEDURE — 93010 ELECTROCARDIOGRAM REPORT: CPT

## 2021-12-13 PROCEDURE — 99233 SBSQ HOSP IP/OBS HIGH 50: CPT

## 2021-12-14 ENCOUNTER — OUTPATIENT (OUTPATIENT)
Dept: OUTPATIENT SERVICES | Facility: HOSPITAL | Age: 76
LOS: 1 days | End: 2021-12-14

## 2021-12-14 PROCEDURE — 93010 ELECTROCARDIOGRAM REPORT: CPT

## 2021-12-14 PROCEDURE — 99232 SBSQ HOSP IP/OBS MODERATE 35: CPT

## 2021-12-15 ENCOUNTER — OUTPATIENT (OUTPATIENT)
Dept: OUTPATIENT SERVICES | Facility: HOSPITAL | Age: 76
LOS: 1 days | End: 2021-12-15

## 2021-12-20 ENCOUNTER — APPOINTMENT (OUTPATIENT)
Dept: CARE COORDINATION | Facility: HOME HEALTH | Age: 76
End: 2021-12-20
Payer: MEDICARE

## 2021-12-20 VITALS
OXYGEN SATURATION: 95 % | SYSTOLIC BLOOD PRESSURE: 140 MMHG | HEART RATE: 76 BPM | TEMPERATURE: 96.4 F | DIASTOLIC BLOOD PRESSURE: 78 MMHG | RESPIRATION RATE: 12 BRPM

## 2021-12-20 DIAGNOSIS — Z60.2 PROBLEMS RELATED TO LIVING ALONE: ICD-10-CM

## 2021-12-20 PROCEDURE — 99348 HOME/RES VST EST LOW MDM 30: CPT

## 2021-12-20 RX ORDER — FLUTICASONE FUROATE AND VILANTEROL TRIFENATATE 50; 25 UG/1; UG/1
POWDER RESPIRATORY (INHALATION)
Refills: 0 | Status: DISCONTINUED | COMMUNITY
End: 2021-12-20

## 2021-12-20 RX ORDER — HYDROCHLOROTHIAZIDE 12.5 MG/1
12.5 CAPSULE ORAL DAILY
Qty: 90 | Refills: 3 | Status: DISCONTINUED | COMMUNITY
Start: 2018-03-01 | End: 2021-12-20

## 2021-12-20 RX ORDER — CIPROFLOXACIN HYDROCHLORIDE 500 MG/1
500 TABLET, FILM COATED ORAL
Qty: 6 | Refills: 0 | Status: COMPLETED | COMMUNITY
Start: 2021-11-30

## 2021-12-20 RX ORDER — NITROFURANTOIN (MONOHYDRATE/MACROCRYSTALS) 25; 75 MG/1; MG/1
100 CAPSULE ORAL
Qty: 10 | Refills: 0 | Status: COMPLETED | COMMUNITY
Start: 2021-11-29

## 2021-12-20 RX ORDER — CEPHALEXIN 500 MG/1
500 CAPSULE ORAL
Qty: 14 | Refills: 0 | Status: COMPLETED | COMMUNITY
Start: 2021-10-10

## 2021-12-20 RX ORDER — SULFAMETHOXAZOLE AND TRIMETHOPRIM 800; 160 MG/1; MG/1
800-160 TABLET ORAL
Qty: 6 | Refills: 0 | Status: COMPLETED | COMMUNITY
Start: 2021-10-21

## 2021-12-20 SDOH — SOCIAL STABILITY - SOCIAL INSECURITY: PROBLEMS RELATED TO LIVING ALONE: Z60.2

## 2021-12-20 NOTE — HISTORY OF PRESENT ILLNESS
[Post-hospitalization from ___ Hospital] : Post-hospitalization from [unfilled] Hospital [Admitted on: ___] : The patient was admitted on [unfilled] [Discharged on ___] : discharged on [unfilled] [Discharge Summary] : discharge summary [Pertinent Labs] : pertinent labs [Radiology Findings] : radiology findings [Discharge Med List] : discharge medication list [Med Reconciliation] : medication reconciliation has been completed [Patient Contacted By: ____] : and contacted by [unfilled] [FreeTextEntry2] : Copied from EMR, "75yo female presenting with dyspnea, admitted with acute on chronic diastolic CHF. PT diuresed with IV lasix, responded well. Cardiology followed while in the hospital. Developed acute renal failure, lasix was stopped, along with lisinopril, hctz. Atenolol was stopped and started on Toprol. Cr improved trending from a peak of 2.8-->1.3 on day of discharge. Pt also required supplemental O2 to maintain sats, weaned off after diuresis. On 12/15/21 medically stable for discharge home. Will f/u with pcp and cardiology after discharge."\par \par Pt seen today for transitional care management. She reports no SOB LE edema, she doesn't weigh herself. FBG-113 she has all her medication.

## 2021-12-20 NOTE — ASSESSMENT
[FreeTextEntry1] : Kj Eric is being seen after discharge home from [facility]. She was admitted on 12/10/21 for CHF and discharged home on  12/16/21.  Discharge medications were reviewed and reconciled with the current medication list and medications in home.\par \par 1)CHF-pt euvolemic\par Denies SOB, LE edema, doesn't weight self daily\par On furosemide, Toprol\par [facility] [employer] TCM teaching: Enforced with patient need for daily weights. Advised to call for an increase of greater than 2 lbs. in a day or 5 pounds in a week. Adhere to low salt diet and educated patient on foods that should be avoided such as processed or fast food. Limit fluids to 1 liter a day which is 4-5 glasses. Continue medications as ordered.  Follow up with Cardiologist. Contact information given, patient advised to call with any questions/concerns. \par \par 2)Diabetes-FBG-113\par Pt on Levemir at hs\par Novolog ss pre-meal\par Reviewed low carb choices for meals\par Pt keeps diary of food and FBG\par \par 3)Obesity-BMI 41.8\par Discussed low carb lifestyle, pt follows the same routine daily\par She goes up and down stairs and walks w/o difficulty\par \par Reminded of TCM program and encouraged pt to call with any questions or concerns and especially with worsening of symptoms. Verbalized understanding.\par

## 2021-12-20 NOTE — PHYSICAL EXAM
[No Acute Distress] : no acute distress [Well Nourished] : well nourished [Well Developed] : well developed [Well-Appearing] : well-appearing [No Respiratory Distress] : no respiratory distress  [Clear to Auscultation] : lungs were clear to auscultation bilaterally [No Accessory Muscle Use] : no accessory muscle use [Normal Rate] : normal rate  [Regular Rhythm] : with a regular rhythm [Normal S1, S2] : normal S1 and S2 [No Murmur] : no murmur heard [Pedal Pulses Present] : the pedal pulses are present [No Edema] : there was no peripheral edema

## 2021-12-21 ENCOUNTER — APPOINTMENT (OUTPATIENT)
Dept: CARDIOLOGY | Facility: CLINIC | Age: 76
End: 2021-12-21

## 2022-01-07 ENCOUNTER — APPOINTMENT (OUTPATIENT)
Dept: CARDIOLOGY | Facility: CLINIC | Age: 77
End: 2022-01-07

## 2022-01-11 ENCOUNTER — APPOINTMENT (OUTPATIENT)
Dept: CARDIOLOGY | Facility: CLINIC | Age: 77
End: 2022-01-11
Payer: MEDICARE

## 2022-01-11 VITALS
BODY MASS INDEX: 41.54 KG/M2 | DIASTOLIC BLOOD PRESSURE: 84 MMHG | HEIGHT: 61 IN | HEART RATE: 79 BPM | WEIGHT: 220 LBS | SYSTOLIC BLOOD PRESSURE: 138 MMHG | TEMPERATURE: 96.9 F | OXYGEN SATURATION: 98 %

## 2022-01-11 DIAGNOSIS — F10.10 ALCOHOL ABUSE, UNCOMPLICATED: ICD-10-CM

## 2022-01-11 PROCEDURE — 99215 OFFICE O/P EST HI 40 MIN: CPT

## 2022-01-11 NOTE — HISTORY OF PRESENT ILLNESS
[FreeTextEntry1] : TITA REYNOLDS is a 76 year old female with a past medical history of:\par \par peripheral arterial disease, status post carotid endarterectomy, and multiple atherosclerotic risk factors. \par Diabetes mellitus, HTN, hyperlipidemia.\par Also told of LE peripheral vascular disease, neuropathy.\par \par There is no prior history of myocardial infarction, percutaneous or surgical revascularization. \par There is no history of symptomatic congestive heart failure, rheumatic heart disease or valvular heart disease. \par There is no history of clinical dysrhythmia, atrial fibrillation.\par \par Chronic dyspnea on exertion.  \par There is no orthopnea. \par There are no symptomatic palpitations or syncope. \par \par She does pulmonary rehab \par major limitation is shortness of breath \par her inhaler was adjusted by her pulmonary physician \par she also has significant musculoskeletal pain \par \par Last seen 9/17/21. Presented 12/1021 with dyspnea and swelling, admitted with acute on chronic diastolic CHF. Pt diuresed with IV lasix, responded well. Cardiology followed while in the hospital. Developed acute renal failure, Lasix was stopped, along with Lisiniopril, hctz. Atenolol was stopped and started on Toprol. Cr improved, trending from a peak of 2.8-1.3, on day of dc. Patient also required supplemental 02 to maintain sats, weaned off after diuresis. Dc'ed 12/15/21. Was on CIWA protocol while in the hospital. Presents today, 1/11/22 for a hospital follow up. States she still has some SOB at times but feels improved. There is minor dizziness with changes in position.  Denies CP, palpitations, syncope, PND, orthopnea, claudication, and edema. Quit smoking 1/1/10. Drinks 1 pint of vodka a day.\par \par \par Testing:\par \par EKG 12/15/21: SB at 57 bpm, HI inteval 190 ms, Qtc 416 ms, nonspecific ST-T wave abnormalities \par \par Labs 12/15/21: WBC 4.19, Hgb 11.9, HCT 36.6, plt 119, Na 136, K 4.4, Cr 1.3.\par \par CT angio chest 12/10/21: No pulm thromboembolism. Mild dependent pleural effusions. mild interlobular thickening suggest fluid overload. hepatic steatosis. focal anterior dome/segment 8 indistinct hypodenisty may represent heteregeneous fatty infiltration. Mild splenomegaly. \par \par Labs 12/14/21: AST 24, ALT 18\par \par Labs 12/13/21: Cr 2.7\par \par Labs 12/10/21: BNP 2085, Trop <0.010\par \par Echo 12/13/21: EF 55-60%. Mild MR.\par \par CXR 12/1021: No evidence of focal infiltrate or lobar consolidation\par \par Labs 10/6/21: CRP 6.5, Cr 0.7, Na 134, K 4, Ca 9.3, AST 57, ALT 32, A1C 6, , TSH 5.1, Hgb 12.9, plt 125, LPa <10, Chol 153, HDL 88, Trigs 95, LDL 47, Mag 1.7, CK 61\par \par last EKG demonstrates sinus rhythm\par \par cardiac catheterization 3/29/21: normal left ventricular function nonobstructive coronary artery disease\par \par [facility] 3/2021: + for ischemia.\par \par Carotids 2/15/21: KIMI and LICA 50-69% stenosis. KIMI and LICA tortuous. Since prior study 1/27/17, increase in BL ICA velocities. \par \par Echocardiogram 7/20 demonstrated normal left ventricular function, mild mitral regurgitation, mild tricuspid regurgitation.  \par \par Blood work August 2020 TSH 2.5, total cholesterol 181, LDL 6 6, potassium 4.8, creatinine 0.8, hemoglobin 13.3.  \par \par Carotid ultrasound was performed on February, 24 2017 which revealed KIMI with less than 50% stenosis and an LICA without significant plaque.\par \par Abd u/s 1/26/17: Limited study. Max sagittal diameter is 2.2 xm x max transverse 2.2 cm. No high grade stenosis.\par \par KRISTAN 2016: Mild to mod dz in both legs.\par

## 2022-01-11 NOTE — REVIEW OF SYSTEMS
[SOB] : shortness of breath [Negative] : Heme/Lymph [Lower Ext Edema] : lower extremity edema [FreeTextEntry5] : Dizziness

## 2022-01-11 NOTE — ASSESSMENT
[FreeTextEntry1] : TITA REYNOLDS is a 76 year old F who presents today Jan 11, 2022 with the above history and the following active issues:\par \par Acute on chronic diastolic CHF: SOB improving. No edema on exam. Continue Lasix 20mg daily with K supplements. Monitor renal function. Note acute renal failure while in patient.\par \par Consider long term SGLT2 inhibitor.\par \par HTN: Cr improved. Resume Losartan 50mg daily. BMP in 1 week. BP check in approx 3 weeks with Dr. Hicks. \par Continue Toprol XL 50mg daily.\par \par HLD: Continue statin.\par \par Peripheral arterial disease: Advised to f/u with vascular. Cont ASA and statin.\par \par Diabetes: F/u with PCP.\par \par Fatty liver/cirrhosis/hepatitis: Advised to f/u with GI.\par \par ETOH abuse: Abstinence discussed and encouraged.\par \par \par Monitor for cor pulmonale\par \par \par Ongoing f/u with PCP.\par \par \par F/U: in 3 weeks.\par Discussed red flag symptoms, which would warrant sooner or emergent medical evaluation.\par Any questions and concerns were addressed and resolved.\par \par Sincerely,\par Ginny Purdy FNP-BC\par Patient's history, testing, and plan was reviewed with supervising physician, Dr. Patrick Valentino

## 2022-01-31 DIAGNOSIS — N19 UNSPECIFIED KIDNEY FAILURE: ICD-10-CM

## 2022-01-31 DIAGNOSIS — J44.1 CHRONIC OBSTRUCTIVE PULMONARY DISEASE WITH (ACUTE) EXACERBATION: ICD-10-CM

## 2022-01-31 DIAGNOSIS — I50.9 HEART FAILURE, UNSPECIFIED: ICD-10-CM

## 2022-02-08 ENCOUNTER — OUTPATIENT (OUTPATIENT)
Dept: OUTPATIENT SERVICES | Facility: HOSPITAL | Age: 77
LOS: 1 days | End: 2022-02-08

## 2022-02-08 DIAGNOSIS — J44.9 CHRONIC OBSTRUCTIVE PULMONARY DISEASE, UNSPECIFIED: ICD-10-CM

## 2022-02-10 DIAGNOSIS — I50.9 HEART FAILURE, UNSPECIFIED: ICD-10-CM

## 2022-02-10 DIAGNOSIS — J44.1 CHRONIC OBSTRUCTIVE PULMONARY DISEASE WITH (ACUTE) EXACERBATION: ICD-10-CM

## 2022-02-18 ENCOUNTER — APPOINTMENT (OUTPATIENT)
Dept: CARDIOLOGY | Facility: CLINIC | Age: 77
End: 2022-02-18

## 2022-03-11 ENCOUNTER — APPOINTMENT (OUTPATIENT)
Dept: CARDIOLOGY | Facility: CLINIC | Age: 77
End: 2022-03-11
Payer: MEDICARE

## 2022-03-11 VITALS
WEIGHT: 221 LBS | OXYGEN SATURATION: 96 % | DIASTOLIC BLOOD PRESSURE: 78 MMHG | BODY MASS INDEX: 41.72 KG/M2 | HEIGHT: 61 IN | HEART RATE: 68 BPM | TEMPERATURE: 96.8 F | SYSTOLIC BLOOD PRESSURE: 126 MMHG

## 2022-03-11 PROCEDURE — 99214 OFFICE O/P EST MOD 30 MIN: CPT

## 2022-03-11 RX ORDER — LORATADINE 10 MG/1
10 CAPSULE, LIQUID FILLED ORAL DAILY
Refills: 0 | Status: ACTIVE | COMMUNITY

## 2022-03-16 ENCOUNTER — NON-APPOINTMENT (OUTPATIENT)
Age: 77
End: 2022-03-16

## 2022-03-21 ENCOUNTER — NON-APPOINTMENT (OUTPATIENT)
Age: 77
End: 2022-03-21

## 2022-03-21 RX ORDER — TORSEMIDE 10 MG/1
10 TABLET ORAL DAILY
Qty: 90 | Refills: 3 | Status: DISCONTINUED | COMMUNITY
Start: 2022-03-11 | End: 2022-03-21

## 2022-03-26 NOTE — ASSESSMENT
[FreeTextEntry1] : adjusted her diuretic \par urology evaluation \par ideally would like to add SGLT2 inhibitor \par \par recent acute on chronic diastolic CHF \par HTN\par Monitor renal function.\par BP control\par \par Peripheral arterial disease\par HLD\par Cont ASA and statin.\par \par Diabetes: F/u with PCP.\par Reduce alcohol\par \par Discussed red flag symptoms, which would warrant sooner or emergent medical evaluation.\par Any questions and concerns were addressed and resolved.\par

## 2022-03-26 NOTE — HISTORY OF PRESENT ILLNESS
[FreeTextEntry1] : TITA REYNOLDS is a 76 year old female with a past medical history of:\par \par peripheral arterial disease, status post carotid endarterectomy, and multiple atherosclerotic risk factors. \par Diabetes mellitus, HTN, hyperlipidemia.\par Also told of LE peripheral vascular disease, neuropathy.\par \par There is no prior history of myocardial infarction, percutaneous or surgical revascularization. \par There is no history of rheumatic heart disease or valvular heart disease. \par There is no history of clinical dysrhythmia, atrial fibrillation.\par \par Chronic dyspnea on exertion.  \par There is no orthopnea. \par There are no symptomatic palpitations or syncope. \par \par She does pulmonary rehab \par major limitation is shortness of breath \par her inhaler was adjusted by her pulmonary physician \par she also has significant musculoskeletal pain \par \par Presented 12/21 with dyspnea and swelling, admitted with acute on chronic diastolic CHF. Pt diuresed with IV lasix, responded well. Cardiology followed while in the hospital. Developed acute renal failure, Lasix was stopped, along with Lisiniopril, hctz. Atenolol was stopped and started on Toprol. Cr improved, trending from a peak of 2.8-1.3, on day of dc. Patient also required supplemental 02 to maintain sats, weaned off after diuresis. Dc'ed 12/15/21. Was on CIWA protocol while in the hospital. Presents today, 1/11/22 for a hospital follow up. States she still has some SOB at times but feels improved. There is minor dizziness with changes in position.  Denies CP, palpitations, syncope, PND, orthopnea, claudication, and edema. \par \par working with physical therapy several times a week there is less shortness of breath \par right now she is having difficulty with incontinence \par \par last blood work hemoglobin 12.9 creatinine 0.9 LDL 66\par EKG 12/15/21: SB at 57 bpm, ND inteval 190 ms, Qtc 416 ms, nonspecific ST-T wave abnormalities \par CT angio chest 12/10/21: No pulm thromboembolism. Mild dependent pleural effusions. mild interlobular thickening suggest fluid overload. hepatic steatosis. focal anterior dome/segment 8 indistinct hypodenisty may represent heteregeneous fatty infiltration. Mild splenomegaly. \par Labs 12/10/21: BNP 2085, Trop <0.010\par Echo 12/13/21: EF 55-60%. Mild MR.\par CXR 12/1021: No evidence of focal infiltrate or lobar consolidation\par cardiac catheterization 3/29/21: normal left ventricular function nonobstructive coronary artery disease\par Carotids 2/15/21: KIMI and LICA 50-69% stenosis. KIMI and LICA tortuous. Since prior study 1/27/17, increase in BL ICA velocities. \par abd u/s 1/26/17: Limited study. Max sagittal diameter is 2.2 xm x max transverse 2.2 cm. No high grade stenosis.\par \par \par

## 2022-03-29 ENCOUNTER — NON-APPOINTMENT (OUTPATIENT)
Age: 77
End: 2022-03-29

## 2022-04-13 ENCOUNTER — APPOINTMENT (OUTPATIENT)
Dept: CT IMAGING | Facility: CLINIC | Age: 77
End: 2022-04-13
Payer: MEDICARE

## 2022-04-13 PROCEDURE — 71250 CT THORAX DX C-: CPT | Mod: MH

## 2022-06-17 ENCOUNTER — APPOINTMENT (OUTPATIENT)
Dept: CARDIOLOGY | Facility: CLINIC | Age: 77
End: 2022-06-17

## 2022-07-28 ENCOUNTER — NON-APPOINTMENT (OUTPATIENT)
Age: 77
End: 2022-07-28

## 2022-08-16 ENCOUNTER — APPOINTMENT (OUTPATIENT)
Dept: CARDIOLOGY | Facility: CLINIC | Age: 77
End: 2022-08-16

## 2022-08-16 VITALS
OXYGEN SATURATION: 98 % | DIASTOLIC BLOOD PRESSURE: 68 MMHG | HEART RATE: 68 BPM | HEIGHT: 61 IN | BODY MASS INDEX: 42.29 KG/M2 | WEIGHT: 224 LBS | SYSTOLIC BLOOD PRESSURE: 158 MMHG | TEMPERATURE: 96.9 F

## 2022-08-16 VITALS — SYSTOLIC BLOOD PRESSURE: 142 MMHG | DIASTOLIC BLOOD PRESSURE: 74 MMHG

## 2022-08-16 DIAGNOSIS — Z87.898 PERSONAL HISTORY OF OTHER SPECIFIED CONDITIONS: ICD-10-CM

## 2022-08-16 DIAGNOSIS — R32 UNSPECIFIED URINARY INCONTINENCE: ICD-10-CM

## 2022-08-16 DIAGNOSIS — E66.01 MORBID (SEVERE) OBESITY DUE TO EXCESS CALORIES: ICD-10-CM

## 2022-08-16 DIAGNOSIS — R94.39 ABNORMAL RESULT OF OTHER CARDIOVASCULAR FUNCTION STUDY: ICD-10-CM

## 2022-08-16 DIAGNOSIS — I49.1 ATRIAL PREMATURE DEPOLARIZATION: ICD-10-CM

## 2022-08-16 DIAGNOSIS — R94.31 ABNORMAL ELECTROCARDIOGRAM [ECG] [EKG]: ICD-10-CM

## 2022-08-16 PROCEDURE — 99214 OFFICE O/P EST MOD 30 MIN: CPT

## 2022-08-16 RX ORDER — BUDESONIDE, GLYCOPYRROLATE, AND FORMOTEROL FUMARATE 160; 9; 4.8 UG/1; UG/1; UG/1
160-9-4.8 AEROSOL, METERED RESPIRATORY (INHALATION)
Qty: 11 | Refills: 0 | Status: ACTIVE | COMMUNITY
Start: 2022-04-04

## 2022-08-16 RX ORDER — UMECLIDINIUM BROMIDE AND VILANTEROL TRIFENATATE 62.5; 25 UG/1; UG/1
62.5-25 POWDER RESPIRATORY (INHALATION)
Qty: 60 | Refills: 0 | Status: DISCONTINUED | COMMUNITY
Start: 2021-09-19 | End: 2022-08-16

## 2022-08-16 RX ORDER — GABAPENTIN 300 MG/1
300 CAPSULE ORAL
Qty: 30 | Refills: 0 | Status: ACTIVE | COMMUNITY
Start: 1900-01-01 | End: 1900-01-01

## 2022-08-16 NOTE — HISTORY OF PRESENT ILLNESS
[FreeTextEntry1] : TITA REYNOLDS is a 77 year old female with a past medical history of:\par \par peripheral arterial disease, status post carotid endarterectomy, and multiple atherosclerotic risk factors. \par Diabetes mellitus, HTN, hyperlipidemia.\par Also told of LE peripheral vascular disease, neuropathy.\par \par There is no prior history of myocardial infarction, percutaneous or surgical revascularization. \par There is no history of rheumatic heart disease or valvular heart disease. \par There is no history of clinical dysrhythmia, atrial fibrillation.\par \par Chronic dyspnea on exertion.  \par There is no orthopnea. \par There are no symptomatic palpitations or syncope. \par \par She does pulmonary rehab \par major limitation is shortness of breath \par her inhaler was adjusted by her pulmonary physician \par she also has significant musculoskeletal pain \par \par She denies chest pain, pressure, palpitations, orthopnea, LE edema, lightheadedness, dizziness, near syncope or syncope. \par \par Presented 12/21 with dyspnea and swelling, admitted with acute on chronic diastolic CHF. Pt diuresed with IV lasix, responded well.  Developed acute renal failure, Lasix was stopped, along with Lisiniopril, hctz. Atenolol was stopped and started on Toprol. Cr improved, trending from a peak of 2.8-1.3, on day of dc. Patient also required supplemental 02 to maintain sats, weaned off after diuresis. Dc'ed 12/15/21. Was on CIWA protocol while in the hospital. \par \par Seen March '22 diuretics adjusted. Had diarrhea on higher doses of torsemide. Seen by pulm interim inhalers adjusted. Now stable symptoms on furosemide 20mg + KCl daily. \par \par working with physical therapy several times a week there is less shortness of breath \par right now she is having difficulty with incontinence, she wears chronic diapers\par there was a UTI early this year and she was unaware \par \par /72 \par Labs 8/1/22 mg 1.5, k 4.2, craet 0.9, \par \par 2/2022  LDL 66\par 12/10/21: BNP 2085\par EKG 12/15/21: SB at 57 bpm, [place] inteval 190 ms, Qtc 416 ms, nonspecific ST-T wave abnormalities \par CT angio chest 12/10/21: No pulm thromboembolism. Mild dependent pleural effusions. mild interlobular thickening suggest fluid overload. hepatic steatosis. focal anterior dome/segment 8 indistinct hypodenisty may represent heteregeneous fatty infiltration. Mild splenomegaly. \par Echo 12/13/21: EF 55-60%. Mild MR.\par CXR 12/1021: No evidence of focal infiltrate or lobar consolidation\par cardiac catheterization 3/29/21: normal left ventricular function nonobstructive coronary artery disease\par Carotids 2/15/21: KIMI and LICA 50-69% stenosis. KIMI and LICA tortuous. Since prior study 1/27/17, increase in BL ICA velocities. \par Abd sono 10/2020 normal caliber aorta\par abd u/s 1/26/17: Limited study. Max sagittal diameter is 2.2 xm x max transverse 2.2 cm. No high grade stenosis.\par

## 2022-08-16 NOTE — ASSESSMENT
[FreeTextEntry1] : TITA REYNOLDS is a 77 year old F who presents today Aug 15, 2022 with the above history and the following active issues: \par \par chronic diastolic CHF \par last admission 12/2021\par Diarrhea on higher doses torsemide\par tolerating furosemide 20mg +KCl well at this time. \par Renal function and electrolytes are stable per recent labs. \par Clinically stable at this time, euvolemic. Chronic dyspnea, concomitant underlying pulm disease. \par ideally would like to add SGLT2 inhibitor, however I am hesitant with chronic incontinence and asx UTI earlier this year. \par Cont current diuretic regimen + ARB + BB\par Ongoing f/u pulm and uro\par \par Monitor BP at home\par Monitor echo - LVEF and PASP prior to next visit\par Advised to continue low sodium diet and monitor daily weights. \par She will call for an increase >2lb in one day or >5lb in one week. \par \par Peripheral arterial disease\par Monitor carotid disease \par HLD\par Cont ASA and statin.\par \par Diabetes: F/u with PCP.\par Reduce alcohol\par \par Clinical followup after US testing in 4 mo. \par Any questions and concerns were addressed and resolved. \par \par Sincerely,\par \par VIC Coburn\par Patients history, testing, and plan reviewed with supervising MD: Dr. Tim Guevara

## 2022-08-16 NOTE — REVIEW OF SYSTEMS
[Urinary Incontinence] : urinary incontinence [Negative] : Heme/Lymph [Joint Pain] : joint pain [Joint Stiffness] : joint stiffness

## 2022-10-13 ENCOUNTER — RX RENEWAL (OUTPATIENT)
Age: 77
End: 2022-10-13

## 2022-11-22 ENCOUNTER — APPOINTMENT (OUTPATIENT)
Dept: CT IMAGING | Facility: CLINIC | Age: 77
End: 2022-11-22

## 2022-11-22 PROCEDURE — 74177 CT ABD & PELVIS W/CONTRAST: CPT | Mod: MH

## 2023-01-27 ENCOUNTER — APPOINTMENT (OUTPATIENT)
Dept: CARDIOLOGY | Facility: CLINIC | Age: 78
End: 2023-01-27
Payer: MEDICARE

## 2023-01-27 PROCEDURE — 93880 EXTRACRANIAL BILAT STUDY: CPT

## 2023-01-27 PROCEDURE — 93306 TTE W/DOPPLER COMPLETE: CPT

## 2023-01-31 ENCOUNTER — NON-APPOINTMENT (OUTPATIENT)
Age: 78
End: 2023-01-31

## 2023-01-31 ENCOUNTER — APPOINTMENT (OUTPATIENT)
Dept: CARDIOLOGY | Facility: CLINIC | Age: 78
End: 2023-01-31

## 2023-03-17 ENCOUNTER — APPOINTMENT (OUTPATIENT)
Dept: CARDIOLOGY | Facility: CLINIC | Age: 78
End: 2023-03-17

## 2023-05-19 NOTE — HISTORY OF PRESENT ILLNESS
[FreeTextEntry1] : TITA REYNOLDS is a 77 year old female with a past medical history of:\par \par peripheral arterial disease, status post carotid endarterectomy, and multiple atherosclerotic risk factors. \par Diabetes mellitus, HTN, hyperlipidemia.\par Also told of LE peripheral vascular disease, neuropathy.\par \par There is no prior history of myocardial infarction, percutaneous or surgical revascularization. \par There is no history of rheumatic heart disease or valvular heart disease. \par There is no history of clinical dysrhythmia, atrial fibrillation.\par \par Chronic dyspnea on exertion.  \par There is no orthopnea. \par There are no symptomatic palpitations or syncope. \par \par She does pulmonary rehab \par major limitation is shortness of breath \par her inhaler was adjusted by her pulmonary physician \par she also has significant musculoskeletal pain \par \par She denies chest pain, pressure, palpitations, orthopnea, LE edema, lightheadedness, dizziness, near syncope or syncope. \par \par Presented 12/21 with dyspnea and swelling, admitted with acute on chronic diastolic CHF. Pt diuresed with IV lasix, responded well.  Developed acute renal failure, Lasix was stopped, along with Lisiniopril, hctz. Atenolol was stopped and started on Toprol. Cr improved, trending from a peak of 2.8-1.3, on day of dc. Patient also required supplemental 02 to maintain sats, weaned off after diuresis. Dc'ed 12/15/21. Was on CIWA protocol while in the hospital. \par \par Seen March '22 diuretics adjusted. Had diarrhea on higher doses of torsemide. Seen by pulm interim inhalers adjusted. Now stable symptoms on furosemide 20mg + KCl daily. \par \par working with physical therapy several times a week there is less shortness of breath \par right now she is having difficulty with incontinence, she wears chronic diapers\par there was a UTI early this year and she was unaware \par \par /72 \par Labs 8/1/22 mg 1.5, k 4.2, craet 0.9, \par \par 2/2022  LDL 66\par 12/10/21: BNP 2085\par EKG 12/15/21: SB at 57 bpm, IA inteval 190 ms, Qtc 416 ms, nonspecific ST-T wave abnormalities \par CT angio chest 12/10/21: No pulm thromboembolism. Mild dependent pleural effusions. mild interlobular thickening suggest fluid overload. hepatic steatosis. focal anterior dome/segment 8 indistinct hypodenisty may represent heteregeneous fatty infiltration. Mild splenomegaly. \par Echo 12/13/21: EF 55-60%. Mild MR.\par CXR 12/1021: No evidence of focal infiltrate or lobar consolidation\par cardiac catheterization 3/29/21: normal left ventricular function nonobstructive coronary artery disease\par Carotids 2/15/21: KIMI and LICA 50-69% stenosis. KIMI and LICA tortuous. Since prior study 1/27/17, increase in BL ICA velocities. \par Abd sono 10/2020 normal caliber aorta\par abd u/s 1/26/17: Limited study. Max sagittal diameter is 2.2 xm x max transverse 2.2 cm. No high grade stenosis.\par \par \par chronic diastolic CHF \par last admission 12/2021\par Diarrhea on higher doses torsemide\par tolerating furosemide 20mg +KCl well at this time. \par Renal function and electrolytes are stable per recent labs. \par Clinically stable at this time, euvolemic. Chronic dyspnea, concomitant underlying pulm disease. \par ideally would like to add SGLT2 inhibitor, however I am hesitant with chronic incontinence and asx UTI earlier this year. \par Cont current diuretic regimen + ARB + BB\par Ongoing f/u pulm and uro\par \par Monitor BP at home\par Monitor echo - LVEF and PASP prior to next visit\par Advised to continue low sodium diet and monitor daily weights. \par She will call for an increase >2lb in one day or >5lb in one week. \par \par Peripheral arterial disease\par Monitor carotid disease \par HLD\par Cont ASA and statin.\par \par Diabetes: F/u with PCP.\par Reduce alcohol\par \par Clinical followup after US testing in 4 mo. \par Any questions and concerns were addressed and resolved. \par \par

## 2023-05-22 ENCOUNTER — NON-APPOINTMENT (OUTPATIENT)
Age: 78
End: 2023-05-22

## 2023-05-22 VITALS — HEIGHT: 61 IN | BODY MASS INDEX: 40.78 KG/M2 | WEIGHT: 216 LBS

## 2023-05-23 ENCOUNTER — APPOINTMENT (OUTPATIENT)
Dept: MRI IMAGING | Facility: CLINIC | Age: 78
End: 2023-05-23

## 2023-05-23 ENCOUNTER — APPOINTMENT (OUTPATIENT)
Dept: CT IMAGING | Facility: CLINIC | Age: 78
End: 2023-05-23
Payer: MEDICARE

## 2023-05-23 PROCEDURE — 71271 CT THORAX LUNG CANCER SCR C-: CPT

## 2023-05-26 ENCOUNTER — APPOINTMENT (OUTPATIENT)
Dept: CARDIOLOGY | Facility: CLINIC | Age: 78
End: 2023-05-26

## 2023-05-31 ENCOUNTER — NON-APPOINTMENT (OUTPATIENT)
Age: 78
End: 2023-05-31

## 2023-05-31 ENCOUNTER — APPOINTMENT (OUTPATIENT)
Dept: CARDIOLOGY | Facility: CLINIC | Age: 78
End: 2023-05-31
Payer: MEDICARE

## 2023-05-31 VITALS
SYSTOLIC BLOOD PRESSURE: 152 MMHG | WEIGHT: 218 LBS | DIASTOLIC BLOOD PRESSURE: 72 MMHG | HEIGHT: 61 IN | BODY MASS INDEX: 41.16 KG/M2 | OXYGEN SATURATION: 97 % | HEART RATE: 76 BPM

## 2023-05-31 DIAGNOSIS — M54.2 CERVICALGIA: ICD-10-CM

## 2023-05-31 PROCEDURE — 99213 OFFICE O/P EST LOW 20 MIN: CPT

## 2023-05-31 RX ORDER — MULTIVIT-MIN/FOLIC/VIT K/LYCOP 400-300MCG
25 MCG TABLET ORAL DAILY
Refills: 0 | Status: DISCONTINUED | COMMUNITY
End: 2023-05-31

## 2023-05-31 RX ORDER — VIT A AND D3 IN COD LIVER OIL 1250-135
100 CAPSULE ORAL DAILY
Refills: 0 | Status: DISCONTINUED | COMMUNITY
End: 2023-05-31

## 2023-05-31 NOTE — ASSESSMENT
[FreeTextEntry1] : Neck pain: The patient had sudden onset of neck pain recently.  It is worse with moving her head to either side or moving her head back.  She is having difficulty sleeping because when she puts her head on the pillow on either side her neck hurts her.  There has been no headache.  There is been no focal weakness.  There is no neurologic symptoms.  There is no chest discomfort.  There is no shortness of breath.  Her neck pain seems orthopedic in origin.  I have recommended spinal orthopedic surgical evaluation.\par \par Hypertension: Most likely reactive to the pain that she is in.  We will continue current pharmacologic therapy.

## 2023-06-06 NOTE — HISTORY OF PRESENT ILLNESS
[Former] : Former [TextBox_13] : Ms. REYNOLDS is a 77 year old female with a history of CAD, PAD, HTN, HLD and COPD.\par \par She was called to review eligibility for Low-Dose CT lung cancer screening.  Reviewed and confirmed that the patient meets screening eligibility criteria:\par \par 77 years old \par \par Smoking Status: Former smoker \par \par Number of pack(s) per day: 2\par Number of years smoked: 50\par Number of pack years smokin\par \par Number of years since quitting smokin\par Quit year: \par \par No symptoms of lung cancer, including new cough, change in cough, hemoptysis, and unintentional weight loss.\par \par No personal history of lung cancer.  No lung cancer in a first degree relative.  No history of lung disease or occupational exposures. [YearQuit] : 2010 [PacksperDay] : 2 [N_Years] : 50 [PacksperYear] : 100

## 2023-06-30 ENCOUNTER — APPOINTMENT (OUTPATIENT)
Dept: CT IMAGING | Facility: CLINIC | Age: 78
End: 2023-06-30
Payer: MEDICARE

## 2023-06-30 PROCEDURE — 74177 CT ABD & PELVIS W/CONTRAST: CPT | Mod: MH

## 2023-07-07 ENCOUNTER — NON-APPOINTMENT (OUTPATIENT)
Age: 78
End: 2023-07-07

## 2023-07-09 NOTE — HISTORY OF PRESENT ILLNESS
[FreeTextEntry1] : TITA REYNOLDS  is a 73 year old  F\par with a history of peripheral arterial disease status post carotid endarterectomy and multiple atherosclerotic risk factors. \par Not smoking any longer. \par Diabetes mellitus, prior tobacco use, HTN, hyperlipidemia.\par Abnormal EKG\par \par There is no prior history of myocardial infarction, percutaneous or surgical revascularization. \par There is no history of symptomatic congestive heart failure, rheumatic heart disease or valvular heart disease. \par There is no history of clinical dysrhythmia, atrial fibrillation or cerebrovascular disease.\par \par Chronic dyspnea on exertion. Uses inhaler. \par There is no exertional chest pain, pressure or discomfort. \par There is no orthopnea. \par There are no symptomatic palpitations, lightheadedness, dizziness or syncope. \par \par There have been no recent changes in her medications. \par She is compliant with her medical regimen. \par  \par She has been seen by gastroenterology. "Cleared" of hepatitis and endoscopy was unremarkable. Now back on aspirin therapy. \par HTN has been well controlled.\par Hyperlipidemia, tolerating statins\par Weight has been steady. No longer following up with bariatric. \par \par Underwent uncomplicated knee replacement. \par She completed a course of physical therapy. \par She is also told of LE peripheral vascular disease. \par There is concomitant neuropathy. \par \par Presents today prior to elective knee surgery. \par She has been less active. \par There is chronic dyspnea on exertion. \par \par EKG demonstrates normal sinus rhythm with possible anteroseptal MI of indeterminate age\par \par Pharmacologic vasodilator study 9.17 with normal myocardial perfusion and no evidence of infarction or ischemia and preserved left ventricular function. \par \par Carotid ultrasound was performed on February, 24 2017 which revealed KIMI with less than 50% stenosis and an LICA without significant plaque.\par \par October 2018. Potassium 4.3, creatinine 0.8, hemoglobin 14.0.\par July 2018. Total cholesterol 166, LDL 70, TSH 2.7, hemoglobin 13.0, hemoglobin A1c 6.5, potassium 4.2, creatinine 0.9,  \par \par Echocardiogram October 2018. Ejection fraction of 60%. Left atrial enlargement. No significant valvular heart disease.\par 
09-Jul-2023 17:00

## 2023-10-13 ENCOUNTER — RX RENEWAL (OUTPATIENT)
Age: 78
End: 2023-10-13

## 2023-11-18 LAB — HBA1C MFR BLD HPLC: 6.6

## 2023-12-06 ENCOUNTER — APPOINTMENT (OUTPATIENT)
Dept: CARDIOLOGY | Facility: CLINIC | Age: 78
End: 2023-12-06

## 2024-01-23 ENCOUNTER — APPOINTMENT (OUTPATIENT)
Dept: OPHTHALMOLOGY | Facility: CLINIC | Age: 79
End: 2024-01-23

## 2024-02-02 ENCOUNTER — APPOINTMENT (OUTPATIENT)
Dept: CARDIOLOGY | Facility: CLINIC | Age: 79
End: 2024-02-02
Payer: MEDICARE

## 2024-02-02 VITALS
BODY MASS INDEX: 41.54 KG/M2 | HEIGHT: 61 IN | DIASTOLIC BLOOD PRESSURE: 70 MMHG | HEART RATE: 70 BPM | OXYGEN SATURATION: 92 % | WEIGHT: 220 LBS | SYSTOLIC BLOOD PRESSURE: 130 MMHG

## 2024-02-02 DIAGNOSIS — I50.9 HEART FAILURE, UNSPECIFIED: ICD-10-CM

## 2024-02-02 DIAGNOSIS — I10 ESSENTIAL (PRIMARY) HYPERTENSION: ICD-10-CM

## 2024-02-02 DIAGNOSIS — I65.22 OCCLUSION AND STENOSIS OF LEFT CAROTID ARTERY: ICD-10-CM

## 2024-02-02 PROCEDURE — 99214 OFFICE O/P EST MOD 30 MIN: CPT

## 2024-02-02 RX ORDER — DIPHENOXYLATE HYDROCHLORIDE AND ATROPINE SULFATE 2.5; .025 MG/1; MG/1
2.5-0.025 TABLET ORAL
Refills: 0 | Status: ACTIVE | COMMUNITY

## 2024-02-02 RX ORDER — SYRINGE AND NEEDLE,INSULIN,1ML 30 G X1/2"
30G X 1/2" SYRINGE, EMPTY DISPOSABLE MISCELLANEOUS
Qty: 100 | Refills: 0 | Status: DISCONTINUED | COMMUNITY
Start: 2021-12-02 | End: 2024-02-02

## 2024-02-02 RX ORDER — INSULIN DETEMIR 100 [IU]/ML
INJECTION, SOLUTION SUBCUTANEOUS AT BEDTIME
Refills: 0 | Status: DISCONTINUED | COMMUNITY
End: 2024-02-02

## 2024-02-02 RX ORDER — PEN NEEDLE, DIABETIC 29 G X1/2"
31G X 8 MM NEEDLE, DISPOSABLE MISCELLANEOUS
Qty: 100 | Refills: 0 | Status: DISCONTINUED | COMMUNITY
Start: 2021-11-30 | End: 2024-02-02

## 2024-02-02 RX ORDER — BLOOD SUGAR DIAGNOSTIC
STRIP MISCELLANEOUS
Qty: 200 | Refills: 0 | Status: DISCONTINUED | COMMUNITY
Start: 2021-11-30 | End: 2024-02-02

## 2024-02-02 RX ORDER — CALCIUM CARB/VITAMIN D3/VIT K1 500-100-40
31G X 5/16" TABLET,CHEWABLE ORAL
Qty: 300 | Refills: 0 | Status: DISCONTINUED | COMMUNITY
Start: 2021-12-02 | End: 2024-02-02

## 2024-02-02 RX ORDER — INSULIN ASPART INJECTION 100 [IU]/ML
100 INJECTION, SOLUTION SUBCUTANEOUS
Refills: 0 | Status: ACTIVE | COMMUNITY

## 2024-02-07 NOTE — HISTORY OF PRESENT ILLNESS
[FreeTextEntry1] : TITA REYNOLDS  is a 78 year old  F  peripheral arterial disease, status post carotid endarterectomy, and multiple atherosclerotic risk factors. Diabetes mellitus, HTN, hyperlipidemia. Also told of LE peripheral vascular disease, neuropathy.  There is no prior history of myocardial infarction, percutaneous or surgical revascularization. There is no history of rheumatic heart disease or valvular heart disease. There is no history of clinical dysrhythmia, atrial fibrillation.  Chronic dyspnea on exertion. There is no orthopnea. She denies chest pain, pressure, palpitations, orthopnea, LE edema, lightheadedness, dizziness, near syncope or syncope.  Presented 12/21 with dyspnea and swelling, admitted with acute on chronic diastolic CHF. Pt diuresed with IV lasix, responded well. Developed acute renal failure, Lasix was stopped, along with Lisiniopril, hctz. Atenolol was stopped and started on Toprol. Cr improved, trending from a peak of 2.8-1.3, on day of dc.   Seen March '22 diuretics adjusted. Had diarrhea on higher doses of torsemide.  She is now on oxygen. She uses 3 L and is followed by pulmonary at [facility]. Her exercise tolerance is limited. There is less edema. She recently had blood work at [facility].   recent blood work total cholesterol 143 LDL 66 TSH 3.9 potassium 4.3 creatinine 0.9 A1c 5.8 hemoglobin 10.4  Outside pulmonary note has been requested.  Follow-up ultrasound imaging. Rule out cor pulmonale. Supplemental oxygen. Clinical follow-up will be scheduled after ultrasound imaging.  Last echocardiogram has normal left ventricular function  last carotid Doppler has mild to moderate atherosclerosis though technically limited study.  Last blood work we have available from April 2023 has a creatinine of 0.9 A1c of 6.6 hemoglobin of 12.9 TSH 3.9 LDL of 81.  Last EKG demonstrates sinus rhythm.  12/10/21: BNP 2085 CT angio chest 12/10/21: No pulm thromboembolism. Mild dependent pleural effusions. mild interlobular thickening suggest fluid overload. hepatic steatosis. focal anterior dome/segment 8 indistinct  cardiac catheterization 3/29/21: normal left ventricular function nonobstructive coronary artery disease Abd sono 10/2020 normal caliber aorta

## 2024-02-07 NOTE — ASSESSMENT
[FreeTextEntry1] :  Outside pulmonary note has been requested.  Follow-up ultrasound imaging. Rule out cor pulmonale. Supplemental oxygen. Clinical follow-up will be scheduled after ultrasound imaging.  chronic diastolic CHF last admission 12/2021 Diarrhea on higher doses torsemide tolerating furosemide 20mg +KCl well at this time. Chronic dyspnea, concomitant underlying pulm disease. Cont current diuretic regimen + ARB + BB not on slgt2 d/t uti  Monitor echo - LVEF and PASP prior to next visit Advised to continue low sodium diet and monitor daily weights.  Peripheral arterial disease Monitor carotid disease HLD Cont ASA and statin.  Any questions and concerns were addressed and resolved.

## 2024-02-23 ENCOUNTER — APPOINTMENT (OUTPATIENT)
Dept: VASCULAR SURGERY | Facility: CLINIC | Age: 79
End: 2024-02-23

## 2024-03-15 ENCOUNTER — APPOINTMENT (OUTPATIENT)
Dept: CARDIOLOGY | Facility: CLINIC | Age: 79
End: 2024-03-15
Payer: MEDICARE

## 2024-03-15 VITALS
BODY MASS INDEX: 40.78 KG/M2 | OXYGEN SATURATION: 93 % | HEART RATE: 68 BPM | HEIGHT: 61 IN | SYSTOLIC BLOOD PRESSURE: 132 MMHG | DIASTOLIC BLOOD PRESSURE: 68 MMHG | WEIGHT: 216 LBS

## 2024-03-15 DIAGNOSIS — R06.02 SHORTNESS OF BREATH: ICD-10-CM

## 2024-03-15 DIAGNOSIS — R19.7 DIARRHEA, UNSPECIFIED: ICD-10-CM

## 2024-03-15 DIAGNOSIS — E11.9 TYPE 2 DIABETES MELLITUS W/OUT COMPLICATIONS: ICD-10-CM

## 2024-03-15 DIAGNOSIS — E78.5 HYPERLIPIDEMIA, UNSPECIFIED: ICD-10-CM

## 2024-03-15 PROCEDURE — 99214 OFFICE O/P EST MOD 30 MIN: CPT

## 2024-03-15 PROCEDURE — 93880 EXTRACRANIAL BILAT STUDY: CPT

## 2024-03-15 PROCEDURE — 93306 TTE W/DOPPLER COMPLETE: CPT

## 2024-03-15 PROCEDURE — 93979 VASCULAR STUDY: CPT

## 2024-03-15 RX ORDER — INSULIN ASPART 100 [IU]/ML
100 INJECTION, SOLUTION INTRAVENOUS; SUBCUTANEOUS ONCE
Refills: 0 | Status: DISCONTINUED | COMMUNITY
End: 2024-03-15

## 2024-03-15 RX ORDER — INSULIN GLARGINE 100 [IU]/ML
100 INJECTION, SOLUTION SUBCUTANEOUS
Refills: 0 | Status: ACTIVE | COMMUNITY

## 2024-03-15 RX ORDER — ATORVASTATIN CALCIUM 40 MG/1
40 TABLET, FILM COATED ORAL
Qty: 90 | Refills: 3 | Status: ACTIVE | COMMUNITY
Start: 2017-11-15 | End: 1900-01-01

## 2024-03-15 RX ORDER — POTASSIUM CHLORIDE 1500 MG/1
20 TABLET, EXTENDED RELEASE ORAL
Qty: 90 | Refills: 3 | Status: ACTIVE | COMMUNITY
Start: 2021-12-16 | End: 1900-01-01

## 2024-03-15 RX ORDER — FUROSEMIDE 40 MG/1
40 TABLET ORAL DAILY
Qty: 30 | Refills: 5 | Status: ACTIVE | COMMUNITY
Start: 2021-12-15 | End: 1900-01-01

## 2024-03-18 NOTE — HISTORY OF PRESENT ILLNESS
[FreeTextEntry1] : TITA REYNOLDS  is a 78 year old  F who is here today for follow up after testing.  she is on home O2 @ 3l NC. She was told she had sleep apnea but can not get a cpap machine due to her home environment. She has not been compliant with her insulin. BG >250s. She has a poor diet. She has difficulty ambulating.  Her echo results: 3. Left ventricular systolic function is grossly normal with an ejection fraction visually estimated at 60 to 65 %. 4. Normal left ventricular diastolic function. 5. Trace mitral regurgitation. 6. Trace tricuspid regurgitation. 7. Pulmonic valve was not well visualized. 8. Estimated pulmonary artery systolic pressure is 13 mmHg. 9. Calcified chordae. 10. Interatrial septum is aneurysmal. 11. No pericardial effusion seen. 12. Compared to the transthoracic echocardiogram performed on 1/27/2023, there have been no significant interval changes. Carotid doppler  Mild to moderate disease is noted within visualized portions. 2. Technically limited study due to pt body habitus and movement of vessels during respiration.  {Pt has pedal edema She refuses to wear compression stockings      peripheral arterial disease, status post carotid endarterectomy, and multiple atherosclerotic risk factors. Diabetes mellitus, HTN, hyperlipidemia. Also told of LE peripheral vascular disease, neuropathy.  There is no prior history of myocardial infarction, percutaneous or surgical revascularization. There is no history of rheumatic heart disease or valvular heart disease. There is no history of clinical dysrhythmia, atrial fibrillation.  Chronic dyspnea on exertion. There is no orthopnea. She denies chest pain, pressure, palpitations, orthopnea, LE edema, lightheadedness, dizziness, near syncope or syncope.    Seen March '22 diuretics adjusted. Had diarrhea on higher doses of torsemide.  U.   recent blood work total cholesterol 143 LDL 66 TSH 3.9 potassium 4.3 creatinine 0.9 A1c 5.8 hemoglobin 10.4  Outside pulmonary note has been requested.  Follow-up ultrasound imaging. Rule out cor pulmonale. Supplemental oxygen. Clinical follow-up will be scheduled after ultrasound imaging.  Last echocardiogram has normal left ventricular function  last carotid Doppler has mild to moderate atherosclerosis though technically limited study.  Last blood work we have available from April 2023 has a creatinine of 0.9 A1c of 6.6 hemoglobin of 12.9 TSH 3.9 LDL of 81.  Last EKG demonstrates sinus rhythm.  12/10/21: BNP 2085 CT angio chest 12/10/21: No pulm thromboembolism. Mild dependent pleural effusions. mild interlobular thickening suggest fluid overload. hepatic steatosis. focal anterior dome/segment 8 indistinct  cardiac catheterization 3/29/21: normal left ventricular function nonobstructive coronary artery disease Abd sono 10/2020 normal caliber aorta

## 2024-03-18 NOTE — PHYSICAL EXAM
[No Acute Distress] : no acute distress [Normal] : normal conjunctiva [Normal S1, S2] : normal S1, S2 [Normal Venous Pressure] : normal venous pressure [No Murmur] : no murmur [Soft] : abdomen soft [Alert and Oriented] : alert and oriented [Normal Bowel Sounds] : normal bowel sounds [de-identified] : diminshed Left side and bases [de-identified] : obese  [de-identified] : slow shuffle [de-identified] : 1+ BL edema

## 2024-03-18 NOTE — REVIEW OF SYSTEMS
[Feeling Fatigued] : feeling fatigued [SOB] : shortness of breath [Dyspnea on exertion] : dyspnea during exertion [Lower Ext Edema] : lower extremity edema [Negative] : Heme/Lymph [Orthopnea] : no orthopnea

## 2024-03-18 NOTE — DISCUSSION/SUMMARY
[FreeTextEntry1] : TITA REYNOLDS is a 78 year old F who presents today Mar 15, 2024 with the above history and the following active issues: Increase Furosemide to 40 mg QD Improve glycemic control Follow up with endocrine. Continue home O2 and attempt to obtain CPAP machine

## 2024-03-21 ENCOUNTER — APPOINTMENT (OUTPATIENT)
Dept: VASCULAR SURGERY | Facility: CLINIC | Age: 79
End: 2024-03-21
Payer: MEDICARE

## 2024-03-21 VITALS
DIASTOLIC BLOOD PRESSURE: 78 MMHG | BODY MASS INDEX: 40.78 KG/M2 | OXYGEN SATURATION: 95 % | TEMPERATURE: 98 F | HEART RATE: 73 BPM | RESPIRATION RATE: 14 BRPM | SYSTOLIC BLOOD PRESSURE: 132 MMHG | WEIGHT: 216 LBS | HEIGHT: 61 IN

## 2024-03-21 PROCEDURE — 99203 OFFICE O/P NEW LOW 30 MIN: CPT

## 2024-03-21 NOTE — ASSESSMENT
[FreeTextEntry1] : 79 yo female with LLE edema and pain.   LLE KATHRYNA boot applied  Pt counseled to walk daily and elevate legs at night RTC in 1 week for BLE venous duplex   A total of 30 minutes was spent with patient and coordinating care

## 2024-03-21 NOTE — HISTORY OF PRESENT ILLNESS
[FreeTextEntry1] : 79 yo female PMHx alcohol abuse, CAD, CHF, DM II, presents for evaluation of LLE edema and pain. Pt states she has had edema and pain of the left ankle for the past year. She does not use compression stockings. She states she is not very active and watches TV most of the day.

## 2024-03-21 NOTE — PHYSICAL EXAM
[2+] : left 2+ [Ankle Swelling (On Exam)] : present [Ankle Swelling Bilaterally] : bilaterally  [Ankle Swelling On The Left] : moderate [Varicose Veins Of Lower Extremities] : bilaterally [de-identified] : NAD. well appearing [FreeTextEntry1] : LLE edema > RLE edema

## 2024-03-29 ENCOUNTER — APPOINTMENT (OUTPATIENT)
Dept: VASCULAR SURGERY | Facility: CLINIC | Age: 79
End: 2024-03-29
Payer: MEDICARE

## 2024-03-29 VITALS
RESPIRATION RATE: 16 BRPM | WEIGHT: 216 LBS | SYSTOLIC BLOOD PRESSURE: 146 MMHG | BODY MASS INDEX: 40.78 KG/M2 | DIASTOLIC BLOOD PRESSURE: 80 MMHG | HEART RATE: 84 BPM | HEIGHT: 61 IN | OXYGEN SATURATION: 90 %

## 2024-03-29 DIAGNOSIS — R60.0 LOCALIZED EDEMA: ICD-10-CM

## 2024-03-29 PROCEDURE — 99213 OFFICE O/P EST LOW 20 MIN: CPT

## 2024-03-29 PROCEDURE — 93970 EXTREMITY STUDY: CPT

## 2024-03-29 NOTE — PHYSICAL EXAM
[2+] : left 2+ [Ankle Swelling (On Exam)] : present [Ankle Swelling Bilaterally] : bilaterally  [Ankle Swelling On The Left] : moderate [Varicose Veins Of Lower Extremities] : bilaterally [de-identified] : NAD. well appearing [FreeTextEntry1] : LLE edema > RLE edema

## 2024-03-29 NOTE — HISTORY OF PRESENT ILLNESS
[FreeTextEntry1] : 3/21/24: 79 yo female PMHx alcohol abuse, CAD, CHF, DM II, presents for evaluation of LLE edema and pain. Pt states she has had edema and pain of the left ankle for the past year. She does not use compression stockings. She states she is not very active and watches TV most of the day.   3/29/24: Pt had LLE UNNA boot on for the past week. She has less edema and pain.

## 2024-03-29 NOTE — ASSESSMENT
[FreeTextEntry1] : 77 yo female with LLE edema and pain. No DVT or insufficiency on duplex   Pt counseled on results of duplex and above diagnosis. Counseled to use compression stockings  Pt counseled to walk daily and elevate legs at night RTC in 3 months to monitor symptoms   A total of 30 minutes was spent with patient and coordinating care

## 2024-04-07 ENCOUNTER — RX RENEWAL (OUTPATIENT)
Age: 79
End: 2024-04-07

## 2024-04-08 RX ORDER — LOSARTAN POTASSIUM 50 MG/1
50 TABLET, FILM COATED ORAL
Qty: 90 | Refills: 1 | Status: ACTIVE | COMMUNITY
Start: 2024-04-07 | End: 1900-01-01

## 2024-05-15 RX ORDER — METOPROLOL SUCCINATE 50 MG/1
50 TABLET, EXTENDED RELEASE ORAL
Qty: 90 | Refills: 3 | Status: ACTIVE | COMMUNITY
Start: 2021-09-17 | End: 1900-01-01

## 2024-06-03 ENCOUNTER — NON-APPOINTMENT (OUTPATIENT)
Age: 79
End: 2024-06-03

## 2024-06-03 VITALS — HEIGHT: 60 IN | WEIGHT: 216 LBS | BODY MASS INDEX: 42.41 KG/M2

## 2024-06-03 DIAGNOSIS — Z87.891 PERSONAL HISTORY OF NICOTINE DEPENDENCE: ICD-10-CM

## 2024-06-03 NOTE — HISTORY OF PRESENT ILLNESS
[Former] : Former [TextBox_13] : Ms. REYNOLDS is a 78 year old female with a history of CAD, PAD, HTN, HLD and COPD. Reviewed and confirmed that the patient meets screening eligibility criteria: 78 years old Smoking Status: Former smoker Number of pack(s) per day: 2 Number of years smoked: 50 Number of pack years smokin Quit year:  No symptoms of lung cancer, including new cough, change in cough, hemoptysis, and unintentional weight loss. No personal history of lung cancer. No lung cancer in a first degree relative. No history of lung disease or occupational exposures. [YearQuit] : 2010 [PacksperYear] : 100

## 2024-07-26 ENCOUNTER — APPOINTMENT (OUTPATIENT)
Dept: CT IMAGING | Facility: CLINIC | Age: 79
End: 2024-07-26

## 2024-07-26 PROCEDURE — 71271 CT THORAX LUNG CANCER SCR C-: CPT | Mod: TC

## 2024-08-08 ENCOUNTER — APPOINTMENT (OUTPATIENT)
Dept: VASCULAR SURGERY | Facility: CLINIC | Age: 79
End: 2024-08-08

## 2024-08-08 PROCEDURE — 99213 OFFICE O/P EST LOW 20 MIN: CPT

## 2024-08-08 NOTE — ASSESSMENT
[FreeTextEntry1] : 77 yo female with improvement in LLE edema and pain. No DVT or insufficiency on duplex   Pt counseled again on results of duplex and above diagnosis. Counseled to use compression stockings  Pt counseled to walk daily and elevate legs at night RTC in 6 months to monitor symptoms   A total of 30 minutes was spent with patient and coordinating care

## 2024-08-08 NOTE — HISTORY OF PRESENT ILLNESS
[FreeTextEntry1] : 3/21/24: 79 yo female PMHx alcohol abuse, CAD, CHF, DM II, presents for evaluation of LLE edema and pain. Pt states she has had edema and pain of the left ankle for the past year. She does not use compression stockings. She states she is not very active and watches TV most of the day.   3/29/24: Pt had LLE UNNA boot on for the past week. She has less edema and pain.   8/8/24: Pt doing well since last visit. She stopped drinking last week and has less edema in her legs.

## 2024-08-08 NOTE — PHYSICAL EXAM
[2+] : left 2+ [Ankle Swelling (On Exam)] : present [Ankle Swelling Bilaterally] : bilaterally  [Ankle Swelling On The Left] : moderate [Varicose Veins Of Lower Extremities] : bilaterally [de-identified] : NAD. well appearing [FreeTextEntry1] : LLE edema > RLE edema

## 2024-09-12 ENCOUNTER — APPOINTMENT (OUTPATIENT)
Dept: VASCULAR SURGERY | Facility: CLINIC | Age: 79
End: 2024-09-12
Payer: MEDICARE

## 2024-09-12 VITALS
DIASTOLIC BLOOD PRESSURE: 60 MMHG | HEIGHT: 60 IN | SYSTOLIC BLOOD PRESSURE: 128 MMHG | HEART RATE: 79 BPM | OXYGEN SATURATION: 93 % | WEIGHT: 216 LBS | BODY MASS INDEX: 42.41 KG/M2

## 2024-09-12 DIAGNOSIS — I83.812 VARICOSE VEINS OF LEFT LOWER EXTREMITY WITH PAIN: ICD-10-CM

## 2024-09-12 PROCEDURE — 93971 EXTREMITY STUDY: CPT | Mod: LT

## 2024-09-12 PROCEDURE — 99213 OFFICE O/P EST LOW 20 MIN: CPT

## 2024-09-12 NOTE — ASSESSMENT
[FreeTextEntry1] : 77 yo female with painful LLE varicose veins.   Pt counseled on results of duplex and above diagnosis. Counseled to use compression stockings  Pt counseled to walk daily and elevate legs at night Plan for LLE varithena injection. The risks and benefits of the surgical procedure were discussed with patient, all questions were answered.  A total of 30 minutes was spent with patient and coordinating care

## 2024-09-12 NOTE — PROCEDURE
[FreeTextEntry1] : 3/29/24 BLE venous duplex: No DVT or insufficiency bilaterally   9/12/24 LLE venous duplex: GSV and SSV normal. Posterior calf varicose vein > 3 mm. No DVT

## 2024-09-12 NOTE — HISTORY OF PRESENT ILLNESS
[FreeTextEntry1] : 3/21/24: 77 yo female PMHx alcohol abuse, CAD, CHF, DM II, presents for evaluation of LLE edema and pain. Pt states she has had edema and pain of the left ankle for the past year. She does not use compression stockings. She states she is not very active and watches TV most of the day.   3/29/24: Pt had LLE UNNA boot on for the past week. She has less edema and pain.   8/8/24: Pt doing well since last visit. She stopped drinking last week and has less edema in her legs.   9/12/24: Pt has severe pain of left calf. She has varicose veins in left calf that she feels are the source of the pain.

## 2024-09-12 NOTE — PHYSICAL EXAM
[2+] : left 2+ [Ankle Swelling (On Exam)] : present [Ankle Swelling Bilaterally] : bilaterally  [Varicose Veins Of Lower Extremities] : present [Varicose Veins Of The Left Leg] : of the left leg [Ankle Swelling On The Left] : moderate [de-identified] : NAD. well appearing [FreeTextEntry1] : LLE edema > RLE edema

## 2024-09-26 ENCOUNTER — APPOINTMENT (OUTPATIENT)
Dept: VASCULAR SURGERY | Facility: CLINIC | Age: 79
End: 2024-09-26
Payer: MEDICARE

## 2024-09-26 VITALS
HEART RATE: 67 BPM | DIASTOLIC BLOOD PRESSURE: 70 MMHG | HEIGHT: 60 IN | OXYGEN SATURATION: 95 % | BODY MASS INDEX: 42.41 KG/M2 | SYSTOLIC BLOOD PRESSURE: 124 MMHG | WEIGHT: 216 LBS

## 2024-09-26 DIAGNOSIS — I83.812 VARICOSE VEINS OF LEFT LOWER EXTREMITY WITH PAIN: ICD-10-CM

## 2024-09-26 PROCEDURE — 36465Z: CUSTOM | Mod: LT

## 2024-09-26 NOTE — PROCEDURE
[FreeTextEntry1] : LLE varithena injection  [FreeTextEntry2] : LLE varicose veins with pain  [FreeTextEntry3] : Explained varithena procedure to patient and obtained verbal consent. All questions answered prior to procedure. 70% alcohol used to prep left leg. One accessory varicose vein off GSV at ankle accessed with 22 gauge IV under ultrasound guidance. 6 cc of varithena injected into varicose veins. Varithena was tracked with ultrasound and did not enter the deep system.  Sterile gauze and ACE wraps applied to left leg. Pt tolerated procedure well.

## 2024-10-01 ENCOUNTER — APPOINTMENT (OUTPATIENT)
Dept: VASCULAR SURGERY | Facility: CLINIC | Age: 79
End: 2024-10-01
Payer: MEDICARE

## 2024-10-01 DIAGNOSIS — R60.0 LOCALIZED EDEMA: ICD-10-CM

## 2024-10-01 PROCEDURE — 93971 EXTREMITY STUDY: CPT | Mod: LT

## 2024-10-01 PROCEDURE — 99213 OFFICE O/P EST LOW 20 MIN: CPT

## 2024-10-01 NOTE — ASSESSMENT
[FreeTextEntry1] : 78 yo female with s/p LLE varithena for LLE pain. No improvement in pain with procedure.  On post op duplex no DVT.    Pt counseled on results of duplex and above diagnosis. Counseled to use compression stockings  Pt counseled to walk daily and elevate legs at night RTC in 3 months to monitor pain   A total of 30 minutes was spent with patient and coordinating care

## 2024-10-01 NOTE — PROCEDURE
[FreeTextEntry1] : 3/29/24 BLE venous duplex: No DVT or insufficiency bilaterally   9/12/24 LLE venous duplex: GSV and SSV normal. Posterior calf varicose vein > 3 mm. No DVT  10/1/24 LLE venous duplex: GSV and SSV normal. thrombosed veins in calf noted. No DVT

## 2024-10-01 NOTE — HISTORY OF PRESENT ILLNESS
[FreeTextEntry1] : 3/21/24: 79 yo female PMHx alcohol abuse, CAD, CHF, DM II, presents for evaluation of LLE edema and pain. Pt states she has had edema and pain of the left ankle for the past year. She does not use compression stockings. She states she is not very active and watches TV most of the day.   3/29/24: Pt had LLE UNNA boot on for the past week. She has less edema and pain.   8/8/24: Pt doing well since last visit. She stopped drinking last week and has less edema in her legs.   9/12/24: Pt has severe pain of left calf. She has varicose veins in left calf that she feels are the source of the pain.   10/1/24: Pt has no improvement in pain after LLE varithena injection. She feels the pain is the same. She is stating today her right leg is painful.   PSHx:  9/26/24 LLE varithena injection

## 2024-10-01 NOTE — PHYSICAL EXAM
[2+] : left 2+ [Ankle Swelling (On Exam)] : present [Ankle Swelling Bilaterally] : bilaterally  [Varicose Veins Of Lower Extremities] : present [Varicose Veins Of The Left Leg] : of the left leg [Ankle Swelling On The Left] : moderate [de-identified] : NAD. well appearing [FreeTextEntry1] : LLE edema > RLE edema

## 2024-11-18 ENCOUNTER — RESULT REVIEW (OUTPATIENT)
Age: 79
End: 2024-11-18

## 2024-11-19 ENCOUNTER — APPOINTMENT (OUTPATIENT)
Dept: CARDIOLOGY | Facility: CLINIC | Age: 79
End: 2024-11-19

## 2024-12-13 ENCOUNTER — APPOINTMENT (OUTPATIENT)
Dept: CARDIOLOGY | Facility: CLINIC | Age: 79
End: 2024-12-13

## 2024-12-13 LAB — HBA1C MFR BLD HPLC: 5.6

## 2025-01-09 ENCOUNTER — APPOINTMENT (OUTPATIENT)
Dept: VASCULAR SURGERY | Facility: CLINIC | Age: 80
End: 2025-01-09

## 2025-01-14 ENCOUNTER — NON-APPOINTMENT (OUTPATIENT)
Age: 80
End: 2025-01-14

## 2025-02-06 ENCOUNTER — APPOINTMENT (OUTPATIENT)
Dept: VASCULAR SURGERY | Facility: CLINIC | Age: 80
End: 2025-02-06